# Patient Record
Sex: FEMALE | Race: BLACK OR AFRICAN AMERICAN | Employment: FULL TIME | URBAN - METROPOLITAN AREA
[De-identification: names, ages, dates, MRNs, and addresses within clinical notes are randomized per-mention and may not be internally consistent; named-entity substitution may affect disease eponyms.]

---

## 2017-01-25 ENCOUNTER — OFFICE VISIT (OUTPATIENT)
Dept: ORTHOPEDIC SURGERY | Age: 47
End: 2017-01-25

## 2017-01-25 VITALS
WEIGHT: 233 LBS | HEIGHT: 65 IN | RESPIRATION RATE: 18 BRPM | HEART RATE: 74 BPM | SYSTOLIC BLOOD PRESSURE: 122 MMHG | TEMPERATURE: 98.2 F | OXYGEN SATURATION: 98 % | BODY MASS INDEX: 38.82 KG/M2 | DIASTOLIC BLOOD PRESSURE: 67 MMHG

## 2017-01-25 DIAGNOSIS — M48.061 LUMBAR STENOSIS: ICD-10-CM

## 2017-01-25 DIAGNOSIS — M47.816 FACET ARTHROPATHY, LUMBAR: ICD-10-CM

## 2017-01-25 DIAGNOSIS — M54.31 RIGHT SIDED SCIATICA: Primary | Chronic | ICD-10-CM

## 2017-01-25 RX ORDER — IBUPROFEN 800 MG/1
800 TABLET ORAL
COMMUNITY
Start: 2015-06-02

## 2017-01-25 RX ORDER — HYDROCODONE BITARTRATE AND ACETAMINOPHEN 5; 325 MG/1; MG/1
TABLET ORAL
COMMUNITY
Start: 2016-08-22 | End: 2017-01-25

## 2017-01-25 RX ORDER — AMOXICILLIN 500 MG/1
CAPSULE ORAL
Refills: 0 | COMMUNITY
Start: 2016-12-15 | End: 2017-08-23

## 2017-01-25 RX ORDER — TERCONAZOLE 4 MG/G
CREAM VAGINAL
Refills: 1 | COMMUNITY
Start: 2016-11-04 | End: 2017-08-23

## 2017-01-25 RX ORDER — METHOCARBAMOL 500 MG/1
500 TABLET, FILM COATED ORAL
COMMUNITY
End: 2017-08-23

## 2017-01-25 RX ORDER — TOPIRAMATE 25 MG/1
TABLET ORAL
Qty: 90 TAB | Refills: 1 | Status: SHIPPED | OUTPATIENT
Start: 2017-01-25 | End: 2017-01-25 | Stop reason: SDUPTHER

## 2017-01-25 RX ORDER — TOPIRAMATE 25 MG/1
TABLET ORAL
Qty: 270 TAB | Refills: 1 | Status: SHIPPED | OUTPATIENT
Start: 2017-01-25 | End: 2017-07-11 | Stop reason: SDUPTHER

## 2017-01-25 RX ORDER — ACETAMINOPHEN AND CODEINE PHOSPHATE 300; 30 MG/1; MG/1
1 TABLET ORAL
Qty: 40 TAB | Refills: 0 | Status: SHIPPED | OUTPATIENT
Start: 2017-01-25 | End: 2017-07-11 | Stop reason: SDUPTHER

## 2017-01-25 RX ORDER — ATORVASTATIN CALCIUM 10 MG/1
TABLET, FILM COATED ORAL
Refills: 11 | COMMUNITY
Start: 2017-01-18

## 2017-01-25 NOTE — PATIENT INSTRUCTIONS
Electromyogram (EMG) and Nerve Conduction Studies: About These Tests  What are they? An electromyogram (EMG) measures the electrical activity of your muscles when you are not using them (at rest) and when you tighten them (muscle contraction). Nerve conduction studies (NCS) measure how well and how fast the nerves can send electrical signals. EMG and nerve conduction studies are often done together. If they are done together, the nerve conduction studies are done before the EMG. Why are they done? You may need an EMG to find diseases that damage your muscles or nerves or to find why you cannot move your muscles (paralysis), why they feel weak, or why they twitch. You may need nerve conduction studies to find damage to the nerves that lead from the brain and spinal cord to the rest of the body (peripheral nervous system). Nerve conduction studies are often used to help find nerve disorders, such as carpal tunnel syndrome. How can you prepare for these tests? · Tell your doctors ALL the medicines, vitamins, supplements, and herbal remedies you take. Some medicines can affect the test results. You may need to stop taking some medicines before you have this test.  · If you take aspirin or some other blood thinner, be sure to talk to your doctor. He or she will tell you if you should stop taking it before your test. Make sure that you understand exactly what your doctor wants you to do. · Wear loose-fitting clothing. You may be given a hospital gown to wear. · The electrodes for the test are attached to your skin. Your skin needs to be clean and free of sprays, oils, creams, and lotions. What happens during the tests? You lie on a table or bed or sit in a reclining chair so your muscles are relaxed. For an EMG:  · Your doctor will insert a needle electrode into a muscle.  This will record the electrical activity while the muscle is at rest. You may feel a quick, sharp pain when the needle electrode is put into a muscle. · Your doctor will ask you to tighten the same muscle slowly and steadily while the electrical activity is recorded. · Your doctor may move the electrode to a different area of the muscle or a different muscle. For nerve conduction studies:  · Your doctor will attach two types of electrodes to your skin. ¨ One type of electrode is placed over a nerve and will give the nerve an electrical pulse. ¨ The other type of electrode is placed over the muscle that the nerve controls. It will record how long it takes the muscle to react to the electrical pulse. · You will be able to feel the electrical pulses. They are small shocks and are safe. What else should you know about these tests? · After an EMG, you may be sore and have a tingling feeling in your muscles for up to 2 days. You may have small bruises or swelling at the needle site. · For an EMG, you may be asked to sign a consent form. Talk to your doctor about any concerns you have about the need for the test, its risks, how it will be done, or what the results will mean. How long do they take? · An EMG may take 30 to 60 minutes. · Nerve conduction tests may take from 15 minutes to 1 hour or more. It depends on how many nerves and muscles your doctor tests. What happens after these tests? · If any of the test areas are sore:  ¨ Put ice or a cold pack on the area for 10 to 20 minutes at a time. Put a thin cloth between the ice and your skin. ¨ Take an over-the-counter pain medicine, such as acetaminophen (Tylenol), ibuprofen (Advil, Motrin), or naproxen (Aleve). Be safe with medicines. Read and follow all instructions on the label. · You will probably be able to go home right away. · You can go back to your usual activities right away. When should you call for help?   Watch closely for changes in your health, and be sure to contact your doctor if:  · Muscle pain from an EMG test gets worse or you have swelling, tenderness, or pus at any of the needle sites. · You have any problems that you think may be from the test.  · You have any questions about the test or have not received your results. Follow-up care is a key part of your treatment and safety. Be sure to make and go to all appointments, and call your doctor if you are having problems. It's also a good idea to keep a list of the medicines you take. Ask your doctor when you can expect to have your test results. Where can you learn more? Go to http://mat-jena.info/. Enter A767 in the search box to learn more about \"Electromyogram (EMG) and Nerve Conduction Studies: About These Tests. \"  Current as of: June 1, 2016  Content Version: 11.1  © 8044-6711 [employer]. Care instructions adapted under license by [employer] (which disclaims liability or warranty for this information). If you have questions about a medical condition or this instruction, always ask your healthcare professional. [address] any warranty or liability for your use of this information.

## 2017-01-25 NOTE — PROGRESS NOTES
Elizabeth Renteria entered per Dr. Burrell Form as documented on blue sheet: EMG right leg, Topamax 25 mg 1 tab PO QHS x 1 week then 2 tabs PO QHS x 1 week then increase to 3 tabs PO QHS #90 with 1 RF

## 2017-01-25 NOTE — PROGRESS NOTES
[address]  [address], [address]  [place] [address]  Phone: [phone]  Fax: [phone]        Kelsi Ross  : 1970  PCP: Tracey Gomes MD    PROGRESS NOTE      ASSESSMENT AND PLAN    Stacey Maguire was seen today for back pain. Diagnoses and all orders for this visit:    Right sided sciatica  -     acetaminophen-codeine (TYLENOL #3) 300-30 mg per tablet; Take 1 Tab by mouth two (2) times daily as needed for Pain. Max Daily Amount: 2 Tabs. -     EMG ONE EXTREMITY LOWER RT; Future    Facet arthropathy, lumbar (HCC)  -     EMG ONE EXTREMITY LOWER RT; Future    Lumbar stenosis  -     acetaminophen-codeine (TYLENOL #3) 300-30 mg per tablet; Take 1 Tab by mouth two (2) times daily as needed for Pain. Max Daily Amount: 2 Tabs. -     EMG ONE EXTREMITY LOWER RT; Future    Other orders  -     topiramate (TOPAMAX) 25 mg tablet; 1 tab PO QHS x 1 week, then 2 tabs PO QHS x 1 week, then 3 tabs PO QHS    1. Continue Cymbalta and Tylenol #3.   2. Trial of Topamax. 3. Referral to Dr. Ronald yBers, Spine Surgery, for surgical evaluation in two months if still symptomatic. 4. EMG of RLE, RA/DM risk factors for mononeuritis. .   5. Given information on EMG. Follow-up Disposition:  Return for sx eval in 6-8 weeks for right S1 radic. HISTORY OF PRESENT ILLNESS  Saira Guillermo is a 42 y.o. female. Pt presents to the office for a f/u visit for RT sciatica and blocks. She had a RT L5 and S1 SNRB in 2016. Pt reports that she had benefit from her injection for only one week before her RT sciatica returned. She denies any negative side effects. from continues to have sciatica on the right side. Her pain radiates down the posterior aspect of thigh and calf. There are times when her pain will stop at her knee. Pt has been suffering from RT sciatica for about two years. She experiences her pain constantly throughout the day.  She notes that her pain interferes with her activity during the day. She has difficulty with laying down at night as well. During the night she will experience paresthesia in her RLE. Her RT foot will go completely numb during the night and with ambulation. She denies any previous hip injuries. She denies any saddle paresthesia. Pt takes Cymbalta 60 mg QHS. Pt takes Tylenol #3 with benefit. Pt has run out of Tylenol #3. Last fill of Tylenol #3 through this office was in October 2016. Pt denies any dizziness, confusion, uncontrolled constipation, and cravings due to controlled substances. She has failed Lyrica due to weight gain. She has tried Topamax for weight loss. Denies persistent fevers, chills, weight changes, neurogenic bowel or bladder symptoms. Pt denies recent ED visits or hospitalizations. PMHx of DM and hypertension. DM is stable at this time, her A1C levels have lowered. RA under control. Pt goes to the gym to stay active. This will often increase her pain. Pain Assessment  1/25/2017   Location of Pain Back   Location Modifiers -   Severity of Pain 0   Quality of Pain Sharp;Dull;Aching   Quality of Pain Comment -   Duration of Pain Persistent   Frequency of Pain Constant   Aggravating Factors Bending;Exercise;Kneeling;Squatting;Standing;Walking   Limiting Behavior Yes   Relieving Factors Rest   Result of Injury No       PAST MEDICAL HISTORY   Past Medical History   Diagnosis Date    Diabetes ([address]     Polyarthralgia 9/15     + IRWIN, trial plaquenil Dr. Victor M Kuhn Rheumatoid arthritis [facility])        Past Surgical History   Procedure Laterality Date    Hx gyn       ectopic pregnancy    Hx tubal ligation     . MEDICATIONS    Current Outpatient Prescriptions   Medication Sig Dispense Refill    HYDROcodone-acetaminophen (NORCO) 5-325 mg per tablet Take 1 Tab by Mouth Every 4 Hours As Needed for Pain.       ibuprofen (MOTRIN) 800 mg tablet 800 mg.      methocarbamol (ROBAXIN) 500 mg tablet 500 mg.      atorvastatin (LIPITOR) 10 mg tablet TK 1 T PO D  11    terconazole (TERAZOL 7) 0.4 % vaginal cream IVB FOR 7 NIGHTS  1    DULoxetine (CYMBALTA) 30 mg capsule TK 1 C PO NIGHTLY  0    DULoxetine (CYMBALTA) 60 mg capsule Take 1 Cap by mouth nightly. Indications: CHRONIC MUSCULOSKELETAL PAIN, NEUROPATHIC PAIN 90 Cap 1    acetaminophen-codeine (TYLENOL #3) 300-30 mg per tablet Take 1 Tab by mouth two (2) times daily as needed for Pain. Max Daily Amount: 2 Tabs. 40 Tab 0    DULoxetine (CYMBALTA) 60 mg capsule Take 1 Cap by mouth daily. 30 Cap 2    lisinopril (PRINIVIL, ZESTRIL) 2.5 mg tablet Take 2.5 mg by mouth. 5    aspirin delayed-release 81 mg tablet Take 81 mg by mouth daily. 5    folic acid (FOLVITE) 1 mg tablet Take 1 mg by mouth daily. 2    hydroxychloroquine (PLAQUENIL) 200 mg tablet Take 200 mg by mouth daily. 1    methotrexate (RHEUMATREX) 2.5 mg tablet Take 2.5 mg by mouth every Monday. Take 7 pills  Indications: RHEUMATOID ARTHRITIS  0    ERGOCALCIFEROL, VITAMIN D2, (VITAMIN D2 PO) Take  by mouth daily.  glucose blood VI test strips (FREESTYLE LITE STRIPS) strip *PLEASE DISPENSE ACCU-CHECK TEST STRIPS* Check fasting sugars once daily 100 Strip 11    Blood-Glucose Meter (FREESTYLE LITE METER) monitoring kit Check fasting sugars once daily 1 Kit 0    Lancets misc Check fasting sugar once daily 1 Each 11    amoxicillin (AMOXIL) 500 mg capsule TK 1 C PO TID FOR 10 DAYS  0        ALLERGIES  Allergies   Allergen Reactions    Diclofenac Hives    Gabapentin Hives     Even low dose of 100 mg.  Causes side effects          SOCIAL HISTORY    Social History     Social History    Marital status:      Spouse name: N/A    Number of children: N/A    Years of education: N/A     Occupational History    process cases      Social History Main Topics    Smoking status: Never Smoker    Smokeless tobacco: Never Used    Alcohol use No    Drug use: Not on file    Sexual activity: Not on file     Other Topics Concern    Not on file     Social History Narrative       FAMILY HISTORY  Family History   Problem Relation Age of Onset    No Known Problems Mother     No Known Problems Father        REVIEW OF SYSTEMS  Review of Systems   Constitutional: Negative for chills, fever and weight loss. Respiratory: Negative for shortness of breath. Cardiovascular: Negative for chest pain. Gastrointestinal: Negative for constipation. Negative for fecal incontinence   Genitourinary: Negative for dysuria. Negative for urinary incontinence   Musculoskeletal:        Per HPI   Skin: Negative for rash. Neurological: Positive for tingling. Negative for dizziness, tremors, focal weakness and headaches. Endo/Heme/Allergies: Does not bruise/bleed easily. Psychiatric/Behavioral: The patient does not have insomnia. PHYSICAL EXAMINATION  Visit Vitals    /67    Pulse 74    Temp 98.2 °F (36.8 °C) (Oral)    Resp 18    Ht 5' 5\" (1.651 m)    Wt 233 lb (105.7 kg)    SpO2 98%    BMI 38.77 kg/m2         Accompanied by self. Constitutional:  Well developed, well nourished, in no acute distress. Psychiatric: Affect and mood are appropriate. Integumentary: No rashes or abrasions noted on exposed areas. Cardiovascular/Peripheral Vascular: Intact l pulses. No peripheral edema is noted. Lymphatic:  No evidence of lymphedema. No cervical lymphadenopathy. SPINE/MUSCULOSKELETAL EXAM      Lumbar spine:  No rash, ecchymosis, or gross obliquity. No fasciculations. No focal atrophy is noted. Tenderness to palpation SI joint. Tenderness to palpation at the RT sciatic notch. Trochanters non tender. Sensation grossly intact to light touch. MOTOR:       Hip Flex  Quads Hamstrings Ankle DF EHL Ankle PF   Right +4/5 +4/5 +4/5 +4/5 +4/5 +4/5   Left +4/5 +4/5 +4/5 +4/5 +4/5 +4/5       Straight Leg raise negative. No pain hip ROM    Ambulation without assistive device. FWB.     Written by Pan Kelly, as dictated by Cornelia Zhou MD.    Ms. Dia Modi may have a reminder for a \"due or due soon\" health maintenance. I have asked that she contact her primary care provider for follow-up on this health maintenance.

## 2017-01-30 ENCOUNTER — DOCUMENTATION ONLY (OUTPATIENT)
Dept: ORTHOPEDIC SURGERY | Age: 47
End: 2017-01-30

## 2017-03-01 ENCOUNTER — OFFICE VISIT (OUTPATIENT)
Dept: NEUROLOGY | Age: 47
End: 2017-03-01

## 2017-03-01 DIAGNOSIS — M54.31 RIGHT SIDED SCIATICA: Primary | ICD-10-CM

## 2017-03-01 NOTE — COMMUNICATION BODY
[facility]  [address] [facility] [place], [address]  [phone]      [facility]    Neurophysiology Report      Patient: Meg Chepe     ID: [id number] Physician: Alecia Huddleston. Luz Maria Ibrahim MD   Gender: Male Ref Phys: Renetta Charlton MD   Handedness:      Study Date: March 1, 2017         Patient History: This 44-year-old patient has had greater than 4 years of pain in her right buttock radiating down to the right foot. On brief exam she has intact strength and reflexes. Sensation is intact to pinprick and light touch.       Nerve Conduction Studies  Anti Sensory Summary Table     Stim Site NR Peak (ms) Norm Peak (ms) O-P Amp (µV) Norm O-P Amp Dist (cm) Tanner (m/s)   Right Sural Anti Sensory (Ankle)   Calf    3.7 <4.4 16.0 >6.0 14.0 50.0   Site 2    3.5  5.5      Site 3    3.6  7.8        Motor Summary Table     Stim Site NR Onset (ms) Norm Onset (ms) O-P Amp (mV) Norm O-P Amp Dist (cm) Tanner (m/s) Norm Tanner (m/s)   Right Peroneal Motor (EDB)   Ankle    4.1 <6.5 2.8 >2.0 31.0 48.4 >44   Fibula (Head)    10.5  2.3       Right Tibial Motor (Abd Randolph Brev)   Ankle    3.5 <5.8 16.2 >4.0 30.0 41.7 >41   Knee    10.7  2.6           H Reflex Studies     NR H-Lat (ms) Lat Norm (ms) L-R H-Lat (ms) L-R Lat Norm   Left Tibial (Gastroc)      30.88 <0.0 0.00 <2.0   Right Tibial (Gastroc)      30.88 <0.0 0.00 <2.0     EMG     Side Muscle Nerve Root Ins Act Fibs Psw Fasc Amp Dur Poly Recrt Int Louise Back Comment   Right AntTibialis Dp Br Fibular L4-5 Nml Nml Nml None Nml Nml 0 Nml Nml    Right MedGastroc   Incr 2+ 1+ None Nml Nml 0 Nml Nml    Right VastusMed Femoral L2-4 Nml Nml Nml None Nml Nml 0 Nml Nml    Right ExtHallLong Dp Br Fibular L5, S1 Nml Nml Nml None Nml Nml 0 Nml Nml    Right BicepsFemS Sciatic L5-S1 Nml Nml Nml None Nml Nml 0 Nml Nml    Right Lumbo Parasp Up Rami L1-2 Nml Nml Nml          Right Lumbo Parasp Mid Rami L3-4 Nml Nml Nml          Right Lumbo Parasp Low Rami L5-S1 Incr 1+ 1+            NCS/EMG FINDINGS:     Evaluation of the Right peroneal motor, the Right tibial motor, and the Right sural sensory nerves were unremarkable. INTERPRETATION: This was an abnormal nerve conduction and EMG study showing evidence of a right-sided L5 or S1 radiculopathy. No signs of neuropathy were identified. ___________________________  Brianna Parnell MD          Waveforms:

## 2017-03-10 ENCOUNTER — OFFICE VISIT (OUTPATIENT)
Dept: ORTHOPEDIC SURGERY | Age: 47
End: 2017-03-10

## 2017-03-10 VITALS
RESPIRATION RATE: 18 BRPM | OXYGEN SATURATION: 97 % | HEIGHT: 65 IN | HEART RATE: 75 BPM | SYSTOLIC BLOOD PRESSURE: 136 MMHG | TEMPERATURE: 98.3 F | DIASTOLIC BLOOD PRESSURE: 67 MMHG | BODY MASS INDEX: 38.82 KG/M2 | WEIGHT: 233 LBS

## 2017-03-10 DIAGNOSIS — M54.31 RIGHT SIDED SCIATICA: ICD-10-CM

## 2017-03-10 DIAGNOSIS — M48.061 LUMBAR STENOSIS: Primary | ICD-10-CM

## 2017-03-10 NOTE — PROGRESS NOTES
[address]  [address], [address]  [place], [address] [place] [address] Street  Phone: [phone]  Fax: [phone]  INITIAL CONSULTATION  Patient: Bronwyn Parra                MRN: [id number]       SSN: [id number]  YOB: 1970        AGE: 42 y.o. SEX: female  Body mass index is 38.77 kg/(m^2). PCP: Thad Rae MD  03/10/17    Chief Complaint   Patient presents with    Back Pain    Referral / Consult         HISTORY OF PRESENT ILLNESS, RADIOGRAPHS, and PLAN:         HISTORY OF PRESENT ILLNESS:  Ms. Nigel Sethi is seen today at the request of Dr. Raymond Sesay and Dr. Arianna Reynoso. Ms. Nigel Sethi is a pleasant, 51-year-old female, generally healthy who works for [employer]. For the past four years, she has had right-sided buttock and leg pain and classic sciatica since doing an exercise ball routine while working out. The pain has been unresponsive to injections, medications, and therapy. It left her quite upset with this ongoing, right-side pain. She presents today for discussion and what further interventions one could consider. The patient is on antineuropathics, Cymbalta, and narcotics, muscle relaxers, and anti-inflammatories. PHYSICAL EXAMINATION:  Physical exam demonstrates a positive straight leg raise, possibly mild weakness of her right EHL. She has good strength of her hamstrings and quadriceps. RADIOGRAPHS:  MRI is reviewed, which demonstrates at L5-S1, foraminal stenosis due to ligamentous hypertrophy and some small disc protrusion causing pointed lateral recess foraminal stenosis. ASSESSMENT/PLAN: I discussed the matter at length with her. The patient has a remarkably normal spine, but if one looks closely at the L5-S1 segment, one initially thinks it is sacralized, but it is not.   It is a mobile segment that has lateral recess foraminal stenosis that is worse on her symptomatic right-hand side, and I think she is having radiculopathy due to it. This is consistent with her selective block and her EMG findings. The patient has been dealing with this issue at least for four years. At this point, I would say, certainly, she may continue to just accept and deal with her symptoms on a chronic basis, or she would be a candidate for a limited laminotomy, medial facetectomy in this area to decompress the nerve root. I discussed the risks, benefits, complications, and alternatives to surgery at length and in detail. She wishes to consider the matter. She will contact me if she wishes to proceed with surgical intervention. cc: Alexandro Morales M.D. Past Medical History:   Diagnosis Date    Diabetes ([facility] [address]     Polyarthralgia 9/15    + [facility]WIN, trial plaquenil Dr. Lashanda Jones Rheumatoid arthritis [facility])        Family History   Problem Relation Age of Onset    No Known Problems Mother     No Known Problems Father        Current Outpatient Prescriptions   Medication Sig Dispense Refill    ibuprofen (MOTRIN) 800 mg tablet 800 mg.      atorvastatin (LIPITOR) 10 mg tablet TK 1 T PO D  11    terconazole (TERAZOL 7) 0.4 % vaginal cream IVB FOR 7 NIGHTS  1    acetaminophen-codeine (TYLENOL #3) 300-30 mg per tablet Take 1 Tab by mouth two (2) times daily as needed for Pain. Max Daily Amount: 2 Tabs. 40 Tab 0    topiramate (TOPAMAX) 25 mg tablet SEE NOTES 270 Tab 1    lisinopril (PRINIVIL, ZESTRIL) 2.5 mg tablet Take 2.5 mg by mouth. 5    aspirin delayed-release 81 mg tablet Take 81 mg by mouth daily. 5    folic acid (FOLVITE) 1 mg tablet Take 1 mg by mouth daily. 2    hydroxychloroquine (PLAQUENIL) 200 mg tablet Take 200 mg by mouth daily. 1    methotrexate (RHEUMATREX) 2.5 mg tablet Take 2.5 mg by mouth every Monday. Take 7 pills  Indications: RHEUMATOID ARTHRITIS  0    ERGOCALCIFEROL, VITAMIN D2, (VITAMIN D2 PO) Take  by mouth daily.       glucose blood VI test strips (FREESTYLE LITE STRIPS) strip *PLEASE DISPENSE ACCU-CHECK TEST STRIPS* Check fasting sugars once daily 100 Strip 11    Blood-Glucose Meter (FREESTYLE LITE METER) monitoring kit Check fasting sugars once daily 1 Kit 0    Lancets misc Check fasting sugar once daily 1 Each 11    methocarbamol (ROBAXIN) 500 mg tablet 500 mg.      amoxicillin (AMOXIL) 500 mg capsule TK 1 C PO TID FOR 10 DAYS  0    DULoxetine (CYMBALTA) 30 mg capsule TK 1 C PO NIGHTLY  0    DULoxetine (CYMBALTA) 60 mg capsule Take 1 Cap by mouth nightly. Indications: CHRONIC MUSCULOSKELETAL PAIN, NEUROPATHIC PAIN 90 Cap 1    DULoxetine (CYMBALTA) 60 mg capsule Take 1 Cap by mouth daily. 30 Cap 2       Allergies   Allergen Reactions    Diclofenac Hives    Gabapentin Hives     Even low dose of 100 mg. Causes side effects    Lyrica [Pregabalin] Other (comments)     Weight gain       Past Surgical History:   Procedure Laterality Date    HX GYN      ectopic pregnancy    HX TUBAL LIGATION         Past Medical History:   Diagnosis Date    Diabetes ([address]     Polyarthralgia 9/15    + IRWIN, trial plaquenil Dr. Eileen Cristina Rheumatoid arthritis [facility])        Social History     Social History    Marital status:      Spouse name: N/A    Number of children: N/A    Years of education: N/A     Occupational History    process cases      Social History Main Topics    Smoking status: Never Smoker    Smokeless tobacco: Never Used    Alcohol use No    Drug use: Not on file    Sexual activity: Not on file     Other Topics Concern    Not on file     Social History Narrative         REVIEW OF SYSTEMS:   CONSTITUTIONAL SYMPTOMS:  Negative. EYES:  Negative. EARS, NOSE, THROAT AND MOUTH:  Negative. CARDIOVASCULAR:  Negative. RESPIRATORY:  Negative. GENITOURINARY: Negative. GASTROINTESTINAL:  Negative. INTEGUMENTARY (SKIN AND/OR BREAST):  Negative. MUSCULOSKELETAL: Per HPI.   ENDOCRINE/RHEUMATOLOGIC:  Negative. NEUROLOGICAL:  Per HPI.    HEMATOLOGIC/LYMPHATIC: Negative. ALLERGIC/IMMUNOLOGIC:  Negative. PSYCHIATRIC:  Negative. PHYSICAL EXAMINATION:   Visit Vitals    /67    Pulse 75    Temp 98.3 °F (36.8 °C) (Oral)    Resp 18    Ht 5' 5\" (1.651 m)    Wt 233 lb (105.7 kg)    SpO2 97%    BMI 38.77 kg/m2    PAIN SCALE: 1/10    CONSTITUTIONAL: The patient is in no apparent distress and is alert and oriented x 3. HEENT: Normocephalic. Hearing grossly intact. NECK: Supple and symmetric. no tenderness, or masses were felt. RESPIRATORY: No labored breathing. CARDIOVASCULAR: The carotid pulses were normal. Peripheral pulses were 2+. CHEST: Normal AP diameter and normal contour without any kyphoscoliosis. LYMPHATIC: No lymphadenopathy was appreciated in the neck, axillae or groin. SKIN: Negative for scars, rashes, lesions, or ulcers on the right upper, right lower, left upper, left lower and trunk. NEUROLOGICAL: Alert and oriented x 3. Ambulation without assistive device. FWB. EXTREMITIES:  See musculoskeletal.  MUSCULOSKELETAL:   Head and Neck:  Negative for misalignment, asymmetry, crepitation, defects, tenderness masses or effusions.  Left Upper Extremity: Inspection, percussion and palpation preformed. Domingos sign is negative.  Right Upper Extremity: Inspection, percussion and palpation preformed. Domingos sign is negative.  Spine, Ribs and Pelvis: Back pain that radiates into RLE. Inspection, percussion and palpation preformed. Negative for misalignment, asymmetry, crepitation, defects, tenderness masses or effusions.  Left Lower Extremity: Inspection, percussion and palpation preformed. Negative straight leg raise.  Right Lower Extremity: RT sciatica. Weakness. Inspection, percussion and palpation preformed. Negative straight leg raise. SPINE EXAM:     Lumbar spine: No rash, ecchymosis, or gross obliquity. No focal atrophy is noted. ASSESSMENT    ICD-10-CM ICD-9-CM    1. Lumbar stenosis M48.06 724.02    2.  Right sided sciatica M54.31 724.3        Written by Von Bishop, as dictated by Ernesto Norris MD.

## 2017-03-19 ENCOUNTER — HOSPITAL ENCOUNTER (EMERGENCY)
Age: 47
Discharge: HOME | End: 2017-03-19
Attending: EMERGENCY MEDICINE
Payer: COMMERCIAL

## 2017-03-19 ENCOUNTER — APPOINTMENT (OUTPATIENT)
Dept: CT IMAGING | Age: 47
End: 2017-03-19
Attending: EMERGENCY MEDICINE
Payer: COMMERCIAL

## 2017-03-19 VITALS
BODY MASS INDEX: 39.61 KG/M2 | HEART RATE: 82 BPM | TEMPERATURE: 98.4 F | SYSTOLIC BLOOD PRESSURE: 126 MMHG | DIASTOLIC BLOOD PRESSURE: 75 MMHG | OXYGEN SATURATION: 100 % | HEIGHT: 64 IN | WEIGHT: 232 LBS | RESPIRATION RATE: 12 BRPM

## 2017-03-19 DIAGNOSIS — K57.92 ACUTE DIVERTICULITIS: Primary | ICD-10-CM

## 2017-03-19 LAB
ALBUMIN SERPL BCP-MCNC: 3.3 G/DL (ref 3.4–5)
ALBUMIN/GLOB SERPL: 0.8 {RATIO} (ref 0.8–1.7)
ALP SERPL-CCNC: 62 U/L (ref 45–117)
ALT SERPL-CCNC: 17 U/L (ref 13–56)
AMYLASE SERPL-CCNC: 40 U/L (ref 25–115)
ANION GAP BLD CALC-SCNC: 10 MMOL/L (ref 3–18)
APPEARANCE UR: CLEAR
AST SERPL W P-5'-P-CCNC: 14 U/L (ref 15–37)
BASOPHILS # BLD AUTO: 0 K/UL (ref 0–0.06)
BASOPHILS # BLD: 0 % (ref 0–2)
BILIRUB SERPL-MCNC: 1 MG/DL (ref 0.2–1)
BILIRUB UR QL: NEGATIVE
BUN SERPL-MCNC: 12 MG/DL (ref 7–18)
BUN/CREAT SERPL: 16 (ref 12–20)
CALCIUM SERPL-MCNC: 8.7 MG/DL (ref 8.5–10.1)
CHLORIDE SERPL-SCNC: 104 MMOL/L (ref 100–108)
CO2 SERPL-SCNC: 26 MMOL/L (ref 21–32)
COLOR UR: YELLOW
CREAT SERPL-MCNC: 0.76 MG/DL (ref 0.6–1.3)
DIFFERENTIAL METHOD BLD: ABNORMAL
EOSINOPHIL # BLD: 0.3 K/UL (ref 0–0.4)
EOSINOPHIL NFR BLD: 3 % (ref 0–5)
ERYTHROCYTE [DISTWIDTH] IN BLOOD BY AUTOMATED COUNT: 14.9 % (ref 11.6–14.5)
GLOBULIN SER CALC-MCNC: 4 G/DL (ref 2–4)
GLUCOSE SERPL-MCNC: 115 MG/DL (ref 74–99)
GLUCOSE UR STRIP.AUTO-MCNC: NEGATIVE MG/DL
HCG UR QL: NEGATIVE
HCT VFR BLD AUTO: 37.6 % (ref 35–45)
HGB BLD-MCNC: 12.8 G/DL (ref 12–16)
HGB UR QL STRIP: NEGATIVE
KETONES UR QL STRIP.AUTO: NEGATIVE MG/DL
LEUKOCYTE ESTERASE UR QL STRIP.AUTO: NEGATIVE
LIPASE SERPL-CCNC: 107 U/L (ref 73–393)
LYMPHOCYTES # BLD AUTO: 16 % (ref 21–52)
LYMPHOCYTES # BLD: 1.8 K/UL (ref 0.9–3.6)
MCH RBC QN AUTO: 28.1 PG (ref 24–34)
MCHC RBC AUTO-ENTMCNC: 34 G/DL (ref 31–37)
MCV RBC AUTO: 82.6 FL (ref 74–97)
MONOCYTES # BLD: 1 K/UL (ref 0.05–1.2)
MONOCYTES NFR BLD AUTO: 9 % (ref 3–10)
NEUTS SEG # BLD: 7.9 K/UL (ref 1.8–8)
NEUTS SEG NFR BLD AUTO: 72 % (ref 40–73)
NITRITE UR QL STRIP.AUTO: NEGATIVE
PH UR STRIP: 5.5 [PH] (ref 5–8)
PLATELET # BLD AUTO: 175 K/UL (ref 135–420)
PMV BLD AUTO: 12.3 FL (ref 9.2–11.8)
POTASSIUM SERPL-SCNC: 3.8 MMOL/L (ref 3.5–5.5)
PROT SERPL-MCNC: 7.3 G/DL (ref 6.4–8.2)
PROT UR STRIP-MCNC: NEGATIVE MG/DL
RBC # BLD AUTO: 4.55 M/UL (ref 4.2–5.3)
SODIUM SERPL-SCNC: 140 MMOL/L (ref 136–145)
SP GR UR REFRACTOMETRY: 1.02 (ref 1–1.03)
UROBILINOGEN UR QL STRIP.AUTO: 0.2 EU/DL (ref 0.2–1)
WBC # BLD AUTO: 10.9 K/UL (ref 4.6–13.2)

## 2017-03-19 PROCEDURE — 85025 COMPLETE CBC W/AUTO DIFF WBC: CPT | Performed by: EMERGENCY MEDICINE

## 2017-03-19 PROCEDURE — 96361 HYDRATE IV INFUSION ADD-ON: CPT

## 2017-03-19 PROCEDURE — 74177 CT ABD & PELVIS W/CONTRAST: CPT

## 2017-03-19 PROCEDURE — 96360 HYDRATION IV INFUSION INIT: CPT

## 2017-03-19 PROCEDURE — 83690 ASSAY OF LIPASE: CPT | Performed by: EMERGENCY MEDICINE

## 2017-03-19 PROCEDURE — 81003 URINALYSIS AUTO W/O SCOPE: CPT | Performed by: EMERGENCY MEDICINE

## 2017-03-19 PROCEDURE — 74011636320 HC RX REV CODE- 636/320: Performed by: EMERGENCY MEDICINE

## 2017-03-19 PROCEDURE — 82150 ASSAY OF AMYLASE: CPT | Performed by: EMERGENCY MEDICINE

## 2017-03-19 PROCEDURE — 81025 URINE PREGNANCY TEST: CPT | Performed by: EMERGENCY MEDICINE

## 2017-03-19 PROCEDURE — 99284 EMERGENCY DEPT VISIT MOD MDM: CPT

## 2017-03-19 PROCEDURE — 74011250636 HC RX REV CODE- 250/636: Performed by: EMERGENCY MEDICINE

## 2017-03-19 PROCEDURE — 80053 COMPREHEN METABOLIC PANEL: CPT | Performed by: EMERGENCY MEDICINE

## 2017-03-19 RX ORDER — CIPROFLOXACIN 500 MG/1
500 TABLET ORAL 2 TIMES DAILY
Qty: 20 TAB | Refills: 0 | Status: SHIPPED | OUTPATIENT
Start: 2017-03-19 | End: 2017-03-29

## 2017-03-19 RX ORDER — METRONIDAZOLE 500 MG/1
500 TABLET ORAL 4 TIMES DAILY
Qty: 40 TAB | Refills: 0 | Status: SHIPPED | OUTPATIENT
Start: 2017-03-19 | End: 2017-03-29

## 2017-03-19 RX ORDER — SODIUM CHLORIDE 9 MG/ML
150 INJECTION, SOLUTION INTRAVENOUS ONCE
Status: COMPLETED | OUTPATIENT
Start: 2017-03-19 | End: 2017-03-19

## 2017-03-19 RX ADMIN — IOPAMIDOL 100 ML: 612 INJECTION, SOLUTION INTRAVENOUS at 06:49

## 2017-03-19 RX ADMIN — SODIUM CHLORIDE 150 ML/HR: 900 INJECTION, SOLUTION INTRAVENOUS at 06:53

## 2017-03-19 NOTE — ED TRIAGE NOTES
Patient C/O lower abdominal pain since yesterday. States she feels the need to have a bowel movement but only mucus and blood is coming out.

## 2017-03-19 NOTE — LETTER
NOTIFICATION RETURN TO WORK / SCHOOL 
 
3/19/2017 8:23 AM 
 
Ms. Joyce Betts [address] To Whom It May Concern: 
 
Joyce Betts is currently under the care of [id number] [facility] EMERGENCY DEPT. She will return to work/school on: 03/21/2017 If there are questions or concerns please have the patient contact our office. Sincerely, Octavia Robles D.O.

## 2017-03-19 NOTE — ED NOTES
Pt returned from CT in stable condition. Pt denies ay abdominal pain. Pt denies any needs from RN at this time. Pt verbalized understanding that awaiting CT result.

## 2017-03-19 NOTE — ED NOTES
Verbal shift change report given to Airam Arauz RN (oncoming nurse) by Johana Mead RN (offgoing nurse). Report included the following information SBAR, Kardex, MAR and Recent Results.

## 2017-03-19 NOTE — ED NOTES
I have reviewed discharge instructions with the patient. The patient verbalized understanding. Current Discharge Medication List      START taking these medications    Details   ciprofloxacin HCl (CIPRO) 500 mg tablet Take 1 Tab by mouth two (2) times a day for 10 days. Qty: 20 Tab, Refills: 0      metroNIDAZOLE (FLAGYL) 500 mg tablet Take 1 Tab by mouth four (4) times daily for 10 days.   Qty: 40 Tab, Refills: 0         Patient armband removed and shredded

## 2017-03-19 NOTE — ED PROVIDER NOTES
HPI Comments: 6:33 AM 6:33 AM Heather Corley is a 42 y.o. female with a history of DM and hyperlipidemia, who presents to ED c/o intermittent, lower abd pain onset yesterday. Pt explains that she was unable to go about her daily activities due to pain and had trouble passing a bowel movement due to pain. She also stated that when she went to use the bathroom the noticed a mucus coming out as if she was passing a bowel. Patient reports that her OB/GYN informed her that she had an oversized ovary, which has caused pain before, but never this bad. Pt denies any use of medication, tobacco, or EtOH. No other concerns at this time. PCP: Lindsey Sanchez MD     Patient is a 42 y.o. female presenting with abdominal pain. The history is provided by the patient. Abdominal Pain           Past Medical History:   Diagnosis Date    Diabetes ([address]     Polyarthralgia 9/15    + IRWIN, trial plaquenil Dr. Cecily Cartwright Rheumatoid arthritis [facility])        Past Surgical History:   Procedure Laterality Date    HX GYN      ectopic pregnancy    HX TUBAL LIGATION           Family History:   Problem Relation Age of Onset    No Known Problems Mother     No Known Problems Father        Social History     Social History    Marital status:      Spouse name: N/A    Number of children: N/A    Years of education: N/A     Occupational History    process cases      Social History Main Topics    Smoking status: Never Smoker    Smokeless tobacco: Never Used    Alcohol use No    Drug use: Not on file    Sexual activity: Not on file     Other Topics Concern    Not on file     Social History Narrative         ALLERGIES: Diclofenac; Gabapentin; and Lyrica [pregabalin]    Review of Systems   Constitutional: Negative. HENT: Negative. Eyes: Negative. Respiratory: Negative. Cardiovascular: Negative. Gastrointestinal: Positive for abdominal pain (Diffuse Lower). Endocrine: Negative. Genitourinary: Negative. Musculoskeletal: Negative. Skin: Negative. Allergic/Immunologic: Negative. Neurological: Negative. Hematological: Negative. Psychiatric/Behavioral: Negative. All other systems reviewed and are negative. Vitals:    03/19/17 0554   BP: 106/70   Pulse: 82   Resp: 12   Temp: 98.4 °F (36.9 °C)   SpO2: 99%   Weight: 105.2 kg (232 lb)   Height: 5' 4\" (1.626 m)            Physical Exam   Constitutional: She is oriented to person, place, and time. She appears well-developed and well-nourished. No distress. HENT:   Head: Normocephalic. Right Ear: External ear normal.   Left Ear: External ear normal.   Mouth/Throat: No oropharyngeal exudate. Eyes: Conjunctivae and EOM are normal. Pupils are equal, round, and reactive to light. Right eye exhibits no discharge. Left eye exhibits no discharge. No scleral icterus. Neck: Normal range of motion. Neck supple. No JVD present. No tracheal deviation present. No thyromegaly present. Cardiovascular: Normal rate, regular rhythm, normal heart sounds and intact distal pulses. Exam reveals no gallop and no friction rub. No murmur heard. Pulmonary/Chest: Effort normal and breath sounds normal. No stridor. No respiratory distress. She has no wheezes. She has no rales. She exhibits no tenderness. Abdominal: Soft. Bowel sounds are normal. She exhibits no distension and no mass. There is tenderness (Diffuse lower abd). There is no rebound and no guarding. Musculoskeletal: Normal range of motion. She exhibits no edema or tenderness. Lymphadenopathy:     She has no cervical adenopathy. Neurological: She is alert and oriented to person, place, and time. She displays normal reflexes. No cranial nerve deficit. She exhibits normal muscle tone. Coordination normal.   Skin: Skin is warm and dry. No rash noted. She is not diaphoretic. No erythema. No pallor. Nursing note and vitals reviewed.        MDM  Number of Diagnoses or Management Options     Amount and/or Complexity of Data Reviewed  Clinical lab tests: ordered and reviewed  Tests in the radiology section of CPT®: ordered and reviewed    Risk of Complications, Morbidity, and/or Mortality  Presenting problems: moderate  Diagnostic procedures: high  Management options: moderate      ED Course       Procedures    7:00AM Care of patient transferred to Dr. Nisha Lopez:   Sherryle Solomons, am acting as a scribe for, and in the presence of, Dr. Daly Ibrahim MD March 19, 2017 at 6:53 AM     Signed by: Matt Gonsales, March 19, 2017 6:53 AM      ...................................................................................................................................................................... 7:00AM Care turned over from Dr. Shannon Llamas. All notes reviewed. 8:11 AM CT ABD/PELV W CONT  Impression: Acute sigmoid diverticulitis. No complication of abscess, perforation, bowel  obstruction. Cholelithiasis. 8:13 AM I have reassessed the patient. Patient is feeling the same. Patient will be treated outpatient and will be prescribed Cipro and Flagyl. Patient was discharged in stable condition. Patient is to return to emergency department if any new or worsening condition.       Scribe Attestation:   Sherryle Solomons, am acting as a scribe for, and in the presence of, Dr. Jamil Franklin DO March 19, 2017 at 8:14 AM     Signed by: Matt Gonsales, March 19, 2017 8:14 AM

## 2017-03-19 NOTE — DISCHARGE INSTRUCTIONS
Diverticulitis: Care Instructions  Your Care Instructions    Diverticulitis occurs when pouches form in the wall of the colon and become inflamed or infected. It can be very painful. Doctors aren't sure what causes diverticulitis. There is no proof that foods such as nuts, seeds, or berries cause it or make it worse. A low-fiber diet may cause the colon to work harder to push stool forward. Pouches may form because of this extra work. It may be hard to think about healthy eating while you're in pain. But as you recover, you might think about how you can use healthy eating for overall better health. Healthy eating may help you avoid future attacks. Follow-up care is a key part of your treatment and safety. Be sure to make and go to all appointments, and call your doctor if you are having problems. It's also a good idea to know your test results and keep a list of the medicines you take. How can you care for yourself at home? · Drink plenty of fluids, enough so that your urine is light yellow or clear like water. If you have kidney, heart, or liver disease and have to limit fluids, talk with your doctor before you increase the amount of fluids you drink. · Stick to liquids or a bland diet (plain rice, bananas, dry toast or crackers, applesauce) until you are feeling better. Then you can return to regular foods and gradually increase the amount of fiber in your diet. · Use a heating pad set on low on your belly to relieve mild cramps and pain. · Get extra rest until you are feeling better. · Be safe with medicines. Read and follow all instructions on the label. ¨ If the doctor gave you a prescription medicine for pain, take it as prescribed. ¨ If you are not taking a prescription pain medicine, ask your doctor if you can take an over-the-counter medicine. · If your doctor prescribed antibiotics, take them as directed. Do not stop taking them just because you feel better.  You need to take the full course of antibiotics. To prevent future attacks of diverticulitis  · Avoid constipation:  ¨ Include fruits, vegetables, beans, and whole grains in your diet each day. These foods are high in fiber. ¨ Drink plenty of fluids, enough so that your urine is light yellow or clear like water. If you have kidney, heart, or liver disease and have to limit fluids, talk with your doctor before you increase the amount of fluids you drink. ¨ Get some exercise every day. Build up slowly to 30 to 60 minutes a day on 5 or more days of the week. ¨ Take a fiber supplement, such as Citrucel or Metamucil, every day if needed. Read and follow all instructions on the label. ¨ Schedule time each day for a bowel movement. Having a daily routine may help. Take your time and do not strain when having a bowel movement. When should you call for help? Call 911 anytime you think you may need emergency care. For example, call if:  · You passed out (lost consciousness). · You vomit blood or what looks like coffee grounds. · You pass maroon or very bloody stools. Call your doctor now or seek immediate medical care if:  · You have severe pain or swelling in your belly. · You have a new or higher fever. · You cannot keep down fluids or medicines. · You have new pain that gets worse when you move or cough. Watch closely for changes in your health, and be sure to contact your doctor if:  · The symptoms you had when you first started feeling sick come back. · You do not get better as expected. Where can you learn more? Go to http://mat-jena.info/. Enter H901 in the search box to learn more about \"Diverticulitis: Care Instructions. \"  Current as of: August 9, 2016  Content Version: 11.1  © 4803-1494 [employer]. Care instructions adapted under license by [employer] (which disclaims liability or warranty for this information).  If you have questions about a medical condition or this instruction, always ask your healthcare professional. [place] [address] any warranty or liability for your use of this information.

## 2017-03-24 ENCOUNTER — HOSPITAL ENCOUNTER (EMERGENCY)
Age: 47
Discharge: HOME | End: 2017-03-24
Attending: EMERGENCY MEDICINE | Admitting: EMERGENCY MEDICINE
Payer: COMMERCIAL

## 2017-03-24 VITALS
DIASTOLIC BLOOD PRESSURE: 85 MMHG | BODY MASS INDEX: 39.82 KG/M2 | SYSTOLIC BLOOD PRESSURE: 124 MMHG | HEART RATE: 73 BPM | TEMPERATURE: 98.2 F | RESPIRATION RATE: 16 BRPM | OXYGEN SATURATION: 97 % | WEIGHT: 232 LBS

## 2017-03-24 DIAGNOSIS — K57.20 DIVERTICULITIS OF LARGE INTESTINE WITH PERFORATION AND ABSCESS WITHOUT BLEEDING: ICD-10-CM

## 2017-03-24 DIAGNOSIS — Z87.19 HISTORY OF MELENA: Primary | ICD-10-CM

## 2017-03-24 LAB
AMORPH CRY URNS QL MICRO: ABNORMAL
ANION GAP BLD CALC-SCNC: 9 MMOL/L (ref 3–18)
APPEARANCE UR: CLEAR
BASOPHILS # BLD AUTO: 0 K/UL (ref 0–0.06)
BASOPHILS # BLD: 1 % (ref 0–2)
BILIRUB UR QL: NEGATIVE
BUN SERPL-MCNC: 10 MG/DL (ref 7–18)
BUN/CREAT SERPL: 12 (ref 12–20)
CALCIUM SERPL-MCNC: 8.7 MG/DL (ref 8.5–10.1)
CHLORIDE SERPL-SCNC: 104 MMOL/L (ref 100–108)
CO2 SERPL-SCNC: 27 MMOL/L (ref 21–32)
COLOR UR: ABNORMAL
CREAT SERPL-MCNC: 0.82 MG/DL (ref 0.6–1.3)
DIFFERENTIAL METHOD BLD: ABNORMAL
EOSINOPHIL # BLD: 0.2 K/UL (ref 0–0.4)
EOSINOPHIL NFR BLD: 3 % (ref 0–5)
EPITH CASTS URNS QL MICRO: ABNORMAL /LPF (ref 0–5)
ERYTHROCYTE [DISTWIDTH] IN BLOOD BY AUTOMATED COUNT: 14.8 % (ref 11.6–14.5)
GLUCOSE SERPL-MCNC: 99 MG/DL (ref 74–99)
GLUCOSE UR STRIP.AUTO-MCNC: NEGATIVE MG/DL
HCT VFR BLD AUTO: 37.9 % (ref 35–45)
HGB BLD-MCNC: 12.8 G/DL (ref 12–16)
HGB UR QL STRIP: ABNORMAL
KETONES UR QL STRIP.AUTO: NEGATIVE MG/DL
LEUKOCYTE ESTERASE UR QL STRIP.AUTO: ABNORMAL
LIPASE SERPL-CCNC: 107 U/L (ref 73–393)
LYMPHOCYTES # BLD AUTO: 43 % (ref 21–52)
LYMPHOCYTES # BLD: 2.8 K/UL (ref 0.9–3.6)
MCH RBC QN AUTO: 27.9 PG (ref 24–34)
MCHC RBC AUTO-ENTMCNC: 33.8 G/DL (ref 31–37)
MCV RBC AUTO: 82.8 FL (ref 74–97)
MONOCYTES # BLD: 0.8 K/UL (ref 0.05–1.2)
MONOCYTES NFR BLD AUTO: 12 % (ref 3–10)
MUCOUS THREADS URNS QL MICRO: ABNORMAL /LPF
NEUTS SEG # BLD: 2.7 K/UL (ref 1.8–8)
NEUTS SEG NFR BLD AUTO: 41 % (ref 40–73)
NITRITE UR QL STRIP.AUTO: NEGATIVE
PH UR STRIP: 5.5 [PH] (ref 5–8)
PLATELET # BLD AUTO: 227 K/UL (ref 135–420)
PMV BLD AUTO: 11.4 FL (ref 9.2–11.8)
POTASSIUM SERPL-SCNC: 3.8 MMOL/L (ref 3.5–5.5)
PROT UR STRIP-MCNC: NEGATIVE MG/DL
RBC # BLD AUTO: 4.58 M/UL (ref 4.2–5.3)
RBC #/AREA URNS HPF: ABNORMAL /HPF (ref 0–5)
SODIUM SERPL-SCNC: 140 MMOL/L (ref 136–145)
SP GR UR REFRACTOMETRY: 1.02 (ref 1–1.03)
UROBILINOGEN UR QL STRIP.AUTO: 0.2 EU/DL (ref 0.2–1)
WBC # BLD AUTO: 6.4 K/UL (ref 4.6–13.2)
WBC URNS QL MICRO: ABNORMAL /HPF (ref 0–4)

## 2017-03-24 PROCEDURE — 83690 ASSAY OF LIPASE: CPT | Performed by: PHYSICIAN ASSISTANT

## 2017-03-24 PROCEDURE — 81001 URINALYSIS AUTO W/SCOPE: CPT | Performed by: PHYSICIAN ASSISTANT

## 2017-03-24 PROCEDURE — 85025 COMPLETE CBC W/AUTO DIFF WBC: CPT | Performed by: PHYSICIAN ASSISTANT

## 2017-03-24 PROCEDURE — 80048 BASIC METABOLIC PNL TOTAL CA: CPT | Performed by: PHYSICIAN ASSISTANT

## 2017-03-24 PROCEDURE — 99283 EMERGENCY DEPT VISIT LOW MDM: CPT

## 2017-03-24 PROCEDURE — 99282 EMERGENCY DEPT VISIT SF MDM: CPT

## 2017-03-24 NOTE — ED PROVIDER NOTES
HPI Comments: 39yo female presenting to ED with 1 episode of dark stool noted today with BM. Pt states she had abnormal diet las night so unsure if this affected her stool. Pt was at work when she noted stool, concerning due to recent diagnosis of divirticulitis therefore came to ED. Denies bright red blood in stool, toilet paper. +bleeding with urination but states she is starting menstrual cycle. Denies abdominal pain/cramping at present. Pt denies fever, chills, NVD, urinary symptoms, CP, SOB, back pain or any other complaints. States she was seen in ED 5 days ago, diagnosed with diverticulitis and sent home with abx and pain medication. Has apt with GI doctor saw PCP 2 days ago. Hx of colonoscopy in past.  Past Medical History:  No date: Diabetes ([facility] [address]  9/15: Polyarthralgia      Comment: + IRWIN, trial plaquenil Dr. Kailyn Brewer  No date: Rheumatoid arthritis ([facility] [[email or web address]   PCP Cathleen Tatum MD     Patient is a 42 y.o. female presenting with abdominal pain and melena. The history is provided by the patient. Abdominal Pain    Associated symptoms include melena. Pertinent negatives include no fever, no nausea, no vomiting, no dysuria, no hematuria, no headaches, no myalgias, no chest pain and no back pain. Melena    Associated symptoms include abdominal pain and vaginal bleeding. Pertinent negatives include no fever, no nausea, no vomiting, no hematuria, no chest pain, no headaches, no coughing and no rash.         Past Medical History:   Diagnosis Date    Diabetes ([facility]     Polyarthralgia 9/15    + IRWIN, trial plaquenil Dr. Eileen Cristina Rheumatoid arthritis [facility])        Past Surgical History:   Procedure Laterality Date    HX GYN      ectopic pregnancy    HX TUBAL LIGATION           Family History:   Problem Relation Age of Onset    No Known Problems Mother     No Known Problems Father        Social History     Social History    Marital status:      Spouse name: N/A    Number of children: N/A    Years of education: N/A     Occupational History    process cases      Social History Main Topics    Smoking status: Never Smoker    Smokeless tobacco: Never Used    Alcohol use No    Drug use: Not on file    Sexual activity: Not on file     Other Topics Concern    Not on file     Social History Narrative         ALLERGIES: Diclofenac; Gabapentin; and Lyrica [pregabalin]    Review of Systems   Constitutional: Negative for chills and fever. HENT: Negative. Negative for congestion and facial swelling. Eyes: Negative for discharge and redness. Respiratory: Negative for cough and shortness of breath. Cardiovascular: Negative for chest pain. Gastrointestinal: Positive for abdominal pain and melena. Negative for nausea and vomiting. Endocrine: Negative. Genitourinary: Positive for vaginal bleeding. Negative for decreased urine volume, dysuria, flank pain, hematuria, menstrual problem, urgency and vaginal pain. Musculoskeletal: Negative for back pain, myalgias and neck pain. Skin: Negative for rash and wound. Allergic/Immunologic: Negative. Neurological: Negative for dizziness, light-headedness and headaches. Hematological: Negative. Psychiatric/Behavioral: Negative. Vitals:    03/24/17 1358   BP: 124/85   Pulse: 73   Resp: 16   Temp: 98.2 °F (36.8 °C)   SpO2: 97%   Weight: 105.2 kg (232 lb)            Physical Exam   Constitutional: She is oriented to person, place, and time. She appears well-developed and well-nourished. No distress. HENT:   Head: Normocephalic and atraumatic. Mouth/Throat: Oropharynx is clear and moist.   Eyes: Conjunctivae are normal.   Neck: Normal range of motion. Neck supple. Cardiovascular: Normal rate, regular rhythm and normal heart sounds. Pulmonary/Chest: Effort normal and breath sounds normal. No respiratory distress. She has no wheezes. She exhibits no tenderness. Abdominal: Soft.  Bowel sounds are normal. She exhibits no distension and no mass. There is no tenderness. There is no rebound and no guarding. No abdominal pain on exam/ no CVAT. No rebound or guarding. Soft   Genitourinary: Rectum normal. Rectal exam shows no external hemorrhoid, no internal hemorrhoid, no fissure, no mass, no tenderness, anal tone normal and guaiac negative stool. Musculoskeletal: Normal range of motion. Neurological: She is alert and oriented to person, place, and time. No cranial nerve deficit. Coordination normal.   Skin: Skin is warm and dry. No rash noted. She is not diaphoretic. Psychiatric: She has a normal mood and affect. Nursing note and vitals reviewed. MDM  Number of Diagnoses or Management Options  History of melena:   Diagnosis management comments: Reviewed CT from previous ED visit. Labs Reviewed  CBC WITH AUTOMATED DIFF - Abnormal; Notable for the following:      RDW                           14.8 (*)               MONOCYTES                     12 (*)              All other components within normal limits  URINALYSIS W/ RFLX MICROSCOPIC - Abnormal; Notable for the following:      Blood                         LARGE (*)               Leukocyte Esterase            SMALL (*)            All other components within normal limits  URINE MICROSCOPIC ONLY - Abnormal; Notable for the following:      Mucus                         1+ (*)                 Amorphous Crystals            1+ (*)              All other components within normal limits  METABOLIC PANEL, BASIC  LIPASE  CT from previous ED visit. IMPRESSION:  Acute sigmoid diverticulitis. No complication of abscess, perforation, bowel  obstruction. Cholelithiasis. Rechecked the patient and updated her on all current results. Abdomen remains non tender, soft and not acute  Pt is very comfortable, active, afebrile, non toxic appearing. Labs reassuring. H&H stable. Pt is still ob abx and taking as directed. Has apt with GI (cant remember date).     Return precautions discussed and understood. Discussed proper way to take medications. Discussed treatment plan, return precautions, symptomatic relief, and expected time to improvement. All questions answered. Patient is stable for discharge and outpatient management.    Lanny Paul PA-C 4:27 PM        Amount and/or Complexity of Data Reviewed  Clinical lab tests: ordered and reviewed  Review and summarize past medical records: yes      ED Course       Procedures

## 2017-03-24 NOTE — LETTER
NOTIFICATION RETURN TO WORK / SCHOOL 
 
3/24/2017 5:00 PM 
 
Ms. [address] To Whom It May Concern: 
 
Joyce Betts is currently under the care of [id number] [facility] EMERGENCY DEPT. She will return to work/school on: 3/27/2017 If there are questions or concerns please have the patient contact our office. Sincerely, SKYLAR Tucker RN

## 2017-03-24 NOTE — ED TRIAGE NOTES
Patient states she was diagnosed with diverticulitis this past week, states she now has blood in her stool. Pt states she called her doctor who referred her here. Pt reports continued abdominal cramping and nausea.

## 2017-03-24 NOTE — DISCHARGE INSTRUCTIONS
Diverticulitis: Care Instructions  Your Care Instructions    Diverticulitis occurs when pouches form in the wall of the colon and become inflamed or infected. It can be very painful. Doctors aren't sure what causes diverticulitis. There is no proof that foods such as nuts, seeds, or berries cause it or make it worse. A low-fiber diet may cause the colon to work harder to push stool forward. Pouches may form because of this extra work. It may be hard to think about healthy eating while you're in pain. But as you recover, you might think about how you can use healthy eating for overall better health. Healthy eating may help you avoid future attacks. Follow-up care is a key part of your treatment and safety. Be sure to make and go to all appointments, and call your doctor if you are having problems. It's also a good idea to know your test results and keep a list of the medicines you take. How can you care for yourself at home? · Drink plenty of fluids, enough so that your urine is light yellow or clear like water. If you have kidney, heart, or liver disease and have to limit fluids, talk with your doctor before you increase the amount of fluids you drink. · Stick to liquids or a bland diet (plain rice, bananas, dry toast or crackers, applesauce) until you are feeling better. Then you can return to regular foods and gradually increase the amount of fiber in your diet. · Use a heating pad set on low on your belly to relieve mild cramps and pain. · Get extra rest until you are feeling better. · Be safe with medicines. Read and follow all instructions on the label. ¨ If the doctor gave you a prescription medicine for pain, take it as prescribed. ¨ If you are not taking a prescription pain medicine, ask your doctor if you can take an over-the-counter medicine. · If your doctor prescribed antibiotics, take them as directed. Do not stop taking them just because you feel better.  You need to take the full course of antibiotics. To prevent future attacks of diverticulitis  · Avoid constipation:  ¨ Include fruits, vegetables, beans, and whole grains in your diet each day. These foods are high in fiber. ¨ Drink plenty of fluids, enough so that your urine is light yellow or clear like water. If you have kidney, heart, or liver disease and have to limit fluids, talk with your doctor before you increase the amount of fluids you drink. ¨ Get some exercise every day. Build up slowly to 30 to 60 minutes a day on 5 or more days of the week. ¨ Take a fiber supplement, such as Citrucel or Metamucil, every day if needed. Read and follow all instructions on the label. ¨ Schedule time each day for a bowel movement. Having a daily routine may help. Take your time and do not strain when having a bowel movement. When should you call for help? Call 911 anytime you think you may need emergency care. For example, call if:  · You passed out (lost consciousness). · You vomit blood or what looks like coffee grounds. · You pass maroon or very bloody stools. Call your doctor now or seek immediate medical care if:  · You have severe pain or swelling in your belly. · You have a new or higher fever. · You cannot keep down fluids or medicines. · You have new pain that gets worse when you move or cough. Watch closely for changes in your health, and be sure to contact your doctor if:  · The symptoms you had when you first started feeling sick come back. · You do not get better as expected. Where can you learn more? Go to http://mat-jena.info/. Enter H901 in the search box to learn more about \"Diverticulitis: Care Instructions. \"  Current as of: August 9, 2016  Content Version: 11.1  © 0774-2600 [employer]. Care instructions adapted under license by [employer] (which disclaims liability or warranty for this information).  If you have questions about a medical condition or this instruction, always ask your healthcare professional. [place] [address] any warranty or liability for your use of this information. Learning About Diverticulosis and Diverticulitis  What are diverticulosis and diverticulitis? In diverticulosis and diverticulitis, pouches called diverticula form in the wall of the large intestine, or colon. · In diverticulosis, the pouches do not cause any pain or other symptoms. · In diverticulitis, the pouches get inflamed or infected and cause symptoms. Doctors aren't sure what causes these pouches in the colon. But they think that a low-fiber diet may play a role. Without fiber to add bulk to the stool, the colon has to work harder than normal to push the stool forward. The pressure from this may cause pouches to form in weak spots along the colon. Some people with diverticulosis get diverticulitis. But experts don't know why this happens. What are the symptoms? · In diverticulosis, most people don't have symptoms. But pouches sometimes bleed. · In diverticulitis, symptoms may last from a few hours to a week or more. They include:  ¨ Belly pain. This is usually in the lower left side. It is sometimes worse when you move. This is the most common symptom. ¨ Fever and chills. ¨ Bloating and gas. ¨ Diarrhea or constipation. ¨ Nausea and sometimes vomiting. ¨ Not feeling like eating. How can you prevent these problems? You may be able to lower your chance of getting diverticulitis. You can do this by taking steps to prevent constipation. · Eat fruits, vegetables, beans, and whole grains every day. These foods are high in fiber. · Drink plenty of fluids (enough so that your urine is light yellow or clear like water). If you have kidney, heart, or liver disease and have to limit fluids, talk with your doctor before you increase the amount of fluids you drink. · Get at least 30 minutes of exercise on most days of the week.  Walking is a good choice. You also may want to do other activities, such as running, swimming, cycling, or playing tennis or team sports. · Take a fiber supplement, such as Citrucel or Metamucil, every day if needed. Read and follow all instructions on the label. · Schedule time each day for a bowel movement. Having a daily routine may help. Take your time and do not strain when having a bowel movement. Some people avoid nuts, seeds, berries, and popcorn. They believe that these foods might get trapped in the diverticula and cause pain. But there is no proof that these foods cause diverticulitis or make it worse. How are these problems treated? · The best way to treat diverticulosis is to avoid constipation. (See the tips above.)  · Treatment for diverticulitis includes antibiotics and often a change in your diet. You may need only liquids at first. Your doctor may suggest pain medicines for pain or belly cramps. In some cases, surgery may be needed. Follow-up care is a key part of your treatment and safety. Be sure to make and go to all appointments, and call your doctor if you are having problems. It's also a good idea to know your test results and keep a list of the medicines you take. Where can you learn more? Go to http://mat-jena.info/. Enter T975 in the search box to learn more about \"Learning About Diverticulosis and Diverticulitis. \"  Current as of: August 9, 2016  Content Version: 11.1  © 9256-4369 [employer]. Care instructions adapted under license by [employer] (which disclaims liability or warranty for this information). If you have questions about a medical condition or this instruction, always ask your healthcare professional. [place] [address] any warranty or liability for your use of this information.        Lower Gastrointestinal Bleeding: Care Instructions  Your Care Instructions    The digestive or gastrointestinal tract goes from the mouth to the anus. It is often called the GI tract. Bleeding in the lower GI tract can happen anywhere in your small or large intestine. It can also happen in your rectum or anus. In some cases, it is caused by an infection, cancer, or inflammatory bowel disease. Or it may be caused by hemorrhoids, diverticulitis, or clotting problems. Light bleeding may not cause any symptoms at first. But if you continue to bleed for a while, you may feel very weak or tired. Sudden, heavy bleeding means you need to see a doctor right away. This kind of bleeding can be very dangerous. But it can usually be cured or controlled. The doctor may do some tests to find the cause of your bleeding. Follow-up care is a key part of your treatment and safety. Be sure to make and go to all appointments, and call your doctor if you are having problems. It's also a good idea to know your test results and keep a list of the medicines you take. How can you care for yourself at home? · Be safe with medicines. Take your medicines exactly as prescribed. Call your doctor if you think you are having a problem with your medicine. You will get more details on the specific medicines your doctor prescribes. · Do not take aspirin or other anti-inflammatory medicines, such as naproxen (Aleve) or ibuprofen (Advil, Motrin), without talking to your doctor first. Ask your doctor if it is okay to use acetaminophen (Tylenol). · Do not drink alcohol. · The bleeding may make you lose iron. So it's important to eat foods that have a lot of iron. These include red meat, shellfish, poultry, and eggs. They also include beans, raisins, whole-grain breads, and leafy green vegetables. If you want help planning meals, you can meet with a dietitian. When should you call for help? Call 911 anytime you think you may need emergency care. For example, call if:  · You have sudden, severe belly pain. · You vomit blood or what looks like coffee grounds.   · You passed out (lost consciousness). · Your stools are maroon or very bloody. Call your doctor now or seek immediate medical care if:  · You are dizzy or lightheaded, or you feel like you may faint. · Your stools are black and look like tar, or they have streaks of blood. · You have belly pain. · You vomit or have nausea. Watch closely for changes in your health, and be sure to contact your doctor if you do not get better as expected. Where can you learn more? Go to http://mat-jena.info/. Enter G038 in the search box to learn more about \"Lower Gastrointestinal Bleeding: Care Instructions. \"  Current as of: May 27, 2016  Content Version: 11.1  © 0844-0853 [employer]. Care instructions adapted under license by [employer] (which disclaims liability or warranty for this information). If you have questions about a medical condition or this instruction, always ask your healthcare professional. [place] [address] any warranty or liability for your use of this information.

## 2017-03-24 NOTE — LETTER
[address] [place] [address] [facility] 
[address] [facility] [address] 
[phone] Work/School Note Date: 3/24/2017 To Whom It May concern: 
 
Angie Cerna was seen and treated today in the emergency room by the following provider(s): 
Attending Provider: Solange Eagle MD 
Physician Assistant: Army Angus PA-C. [facility] return to work 3/17/17 Sincerely, Army Angus PA-C

## 2017-06-23 ENCOUNTER — HOSPITAL ENCOUNTER (OUTPATIENT)
Dept: MAMMOGRAPHY | Age: 47
Discharge: HOME | End: 2017-06-23
Attending: INTERNAL MEDICINE
Payer: COMMERCIAL

## 2017-06-23 DIAGNOSIS — Z12.31 VISIT FOR SCREENING MAMMOGRAM: ICD-10-CM

## 2017-06-23 PROCEDURE — 77067 SCR MAMMO BI INCL CAD: CPT

## 2017-07-11 ENCOUNTER — TELEPHONE (OUTPATIENT)
Dept: ORTHOPEDIC SURGERY | Age: 47
End: 2017-07-11

## 2017-07-11 DIAGNOSIS — M54.31 RIGHT SIDED SCIATICA: Chronic | ICD-10-CM

## 2017-07-11 DIAGNOSIS — M48.061 LUMBAR STENOSIS: ICD-10-CM

## 2017-07-11 RX ORDER — ACETAMINOPHEN AND CODEINE PHOSPHATE 300; 30 MG/1; MG/1
1 TABLET ORAL
Qty: 40 TAB | Refills: 0 | OUTPATIENT
Start: 2017-07-11

## 2017-07-11 RX ORDER — TOPIRAMATE 25 MG/1
75 TABLET ORAL DAILY
Qty: 270 TAB | Refills: 1 | Status: SHIPPED | OUTPATIENT
Start: 2017-07-11 | End: 2017-08-23 | Stop reason: DRUGHIGH

## 2017-07-11 NOTE — TELEPHONE ENCOUNTER
Patient is requesting refill on Topamax and Tylenol #3. Patient last seen 3/10/17, next visit 8/23. Dr. Luz Manuel last prescribed Tylenol #3 in Jan 2017 according to .  Please advise

## 2017-07-11 NOTE — TELEPHONE ENCOUNTER
Need refill of Topirmate 25mg and Tylenol#3 for pain. Next available appt w/ Dr. Winn Mt is not till 08/23. Please call patient back at [phone].

## 2017-08-23 ENCOUNTER — OFFICE VISIT (OUTPATIENT)
Dept: ORTHOPEDIC SURGERY | Age: 47
End: 2017-08-23

## 2017-08-23 VITALS
HEART RATE: 72 BPM | HEIGHT: 64 IN | WEIGHT: 233.8 LBS | OXYGEN SATURATION: 99 % | RESPIRATION RATE: 18 BRPM | TEMPERATURE: 98.5 F | DIASTOLIC BLOOD PRESSURE: 83 MMHG | BODY MASS INDEX: 39.91 KG/M2 | SYSTOLIC BLOOD PRESSURE: 142 MMHG

## 2017-08-23 DIAGNOSIS — M54.31 RIGHT SIDED SCIATICA: Primary | Chronic | ICD-10-CM

## 2017-08-23 RX ORDER — TOPIRAMATE 100 MG/1
100 TABLET, FILM COATED ORAL
Qty: 90 TAB | Refills: 1 | Status: SHIPPED | OUTPATIENT
Start: 2017-08-23

## 2017-08-23 NOTE — PATIENT INSTRUCTIONS
Learning About Laminotomy and Laminectomy Surgery  What are laminotomy and laminectomy? Laminotomy and laminectomy are two surgeries to relieve pressure on the spinal cord. They make the opening in the spine (spinal canal) larger. As you get older or if you have an injury, the opening for the spine gets smaller. This condition is called stenosis. During a laminectomy, the doctor removes pieces of the bony covering (the lamina) and other tissues that are squeezing the spinal cord and nerves. A laminotomy removes less of the bony covering. Both surgeries make the opening for the spinal cord and nerves larger. This takes pressure off the nerves. How is a laminotomy or laminectomy done? The doctor will make a cut (incision) over your spine. The muscles around your spine are pulled to the side so the doctor can work on the bones (vertebra) of the spine. The doctor can then trim thickened tissue, such as bulging discs. And he or she can take out some bone to make the opening for the spinal cord or nerves larger. Sometimes, after the doctor makes the opening larger, he or she will do another type of surgery called spinal fusion. The doctor will insert a small piece of bone from another part of the body or from a bone bank. This piece of bone can help the spine heal. Sometimes small plates and screws are used to keep the bones in place. Surgery will last from 1 to 1½ hours, depending on how much tissue the doctor cuts away. If a spinal fusion is done at the same time, surgery will last 2 to 4 hours. What can you expect after a laminectomy or laminotomy? Plan for a hospital stay of 1 or 2 days. You will have some pain after surgery. You will get medicine to treat the pain. Do not be discouraged if you do not feel better right away. As soon as the anesthesia wears off, you can start moving in the hospital. Get up and walk carefully as soon as you can.  This will help your body heal. You may shower on the day after surgery. You will have help changing the bandages over your stitches after you bathe. You can return to your usual activities after 4 to 6 weeks. It may take longer for you to do more strenuous exercise. Do not drive until your doctor says it is okay. If you had a spinal fusion, it needs time to heal. Sudden twisting movements can slow healing. If you had a spinal fusion done, you may need more time to get back to your usual activities. You may need to wear a brace. You may feel tired for several weeks after surgery. You will feel stiff and sore. But you should feel a little better every day. Daily exercise will help you get back your energy. Try walking. See if you can walk a little bit farther every day. Follow-up care is a key part of your treatment and safety. Be sure to make and go to all appointments, and call your doctor if you are having problems. It's also a good idea to know your test results and keep a list of the medicines you take. Where can you learn more? Go to http://mat-jena.info/. Enter Q597 in the search box to learn more about \"Learning About Laminotomy and Laminectomy Surgery. \"  Current as of: March 21, 2017  Content Version: 11.3  © 6476-9070 [employer]. Care instructions adapted under license by [employer] (which disclaims liability or warranty for this information). If you have questions about a medical condition or this instruction, always ask your healthcare professional. [place] [address] any warranty or liability for your use of this information.

## 2017-08-23 NOTE — PROGRESS NOTES
[facility] [address]  [address], [address]  [place] [address]  Phone: [phone]  Fax: [phone]        Cathie Taylor  : 1970  PCP: Senait Miller MD    PROGRESS NOTE      ASSESSMENT AND PLAN    Diagnoses and all orders for this visit:    1. Right sided sciatica  -     topiramate (TOPAMAX) 100 mg tablet; Take 1 Tab by mouth nightly. 1. Pt to consult with Neurosurgery for second opinion-Dr. Compa Tee. 2. Increase Topamax to 100 mg QHS. 3. Advised to stay active as tolerated. 4. Given information on laminectomy and laminotomy surgery. Follow-up Disposition:  Return in about 6 months (around 2018). HISTORY OF PRESENT ILLNESS  Saira Antonio is a 42 y.o. female. Pt presents to the office for a f/u visit for back pain. She was last evaluated by Dr. Abraham Johnson in 3/2017 and was recommended for a limited laminotomy or continued conservative treatments. Pt returns to the office as she does not wish to have surgery. Pt notes that she wishes to try something else for her pain. Pt requesting second opinion for her back. Pt has been told by friends that neurosurgeons should do her back surgery. Her back pain has gotten better due to yoga and piliates. Pt continues to have sciatica on the right side. Her pain radiates down the posterior aspect of thigh, does not radiate past her knee. Pt has been suffering from RT sciatica for about two years and describes it as annoying. She experiences her pain constantly throughout the day and will push herself to work through her pain. She notes that her pain does interfere with her activity during the day. She denies any saddle paresthesia. Pt takes Cymbalta 60 mg QHS. Pt takes Tylenol #3 with benefit. Pt denies any dizziness, confusion, uncontrolled constipation, and cravings due to controlled substances. She has failed Lyrica due to weight gain.  She is on Topamax 75 mg QHS, has not lost any weight but has not gained any either. Denies persistent fevers, chills, weight changes, neurogenic bowel or bladder symptoms. Pt denies recent ED visits or hospitalizations. PMHx of DM, stable,  and hypertension. Has RA. Pain Assessment  8/23/2017   Location of Pain Back;Knee   Pain Location Comment to back of knee   Location Modifiers Right   Severity of Pain 5   Quality of Pain Aching   Quality of Pain Comment -   Duration of Pain Persistent   Frequency of Pain Intermittent   Date Pain First Started -   Aggravating Factors Walking;Standing;Stairs;Squatting;Kneeling;Exercise;Straightening;Stretching;Bending   Aggravating Factors Comment -   Limiting Behavior -   Relieving Factors (No Data)   Relieving Factors Comment medicine   Result of Injury No       PAST MEDICAL HISTORY   Past Medical History:   Diagnosis Date    Diabetes ([facility] [address]     Polyarthralgia 9/15    + [facility], trial plaquenil Dr. Valencia Mendes Rheumatoid arthritis [facility])        Past Surgical History:   Procedure Laterality Date    HX GYN      ectopic pregnancy    HX TUBAL LIGATION     . MEDICATIONS    Current Outpatient Prescriptions   Medication Sig Dispense Refill    topiramate (TOPAMAX) 25 mg tablet Take 3 Tabs by mouth daily. Take 3 tabs daily at night. 270 Tab 1    acetaminophen-codeine (TYLENOL #3) 300-30 mg per tablet Take 1 Tab by mouth two (2) times daily as needed for Pain. Max Daily Amount: 2 Tabs. 40 Tab 0    ibuprofen (MOTRIN) 800 mg tablet 800 mg.      atorvastatin (LIPITOR) 10 mg tablet TK 1 T PO D  11    aspirin delayed-release 81 mg tablet Take 81 mg by mouth daily. 5    folic acid (FOLVITE) 1 mg tablet Take 1 mg by mouth daily. 2    hydroxychloroquine (PLAQUENIL) 200 mg tablet Take 200 mg by mouth daily. 1    methotrexate (RHEUMATREX) 2.5 mg tablet Take 2.5 mg by mouth every Monday. Take 7 pills  Indications: RHEUMATOID ARTHRITIS  0    ERGOCALCIFEROL, VITAMIN D2, (VITAMIN D2 PO) Take  by mouth daily.       methocarbamol (ROBAXIN) 500 mg tablet 500 mg.      amoxicillin (AMOXIL) 500 mg capsule TK 1 C PO TID FOR 10 DAYS  0    terconazole (TERAZOL 7) 0.4 % vaginal cream IVB FOR 7 NIGHTS  1    DULoxetine (CYMBALTA) 30 mg capsule TK 1 C PO NIGHTLY  0    DULoxetine (CYMBALTA) 60 mg capsule Take 1 Cap by mouth nightly. Indications: CHRONIC MUSCULOSKELETAL PAIN, NEUROPATHIC PAIN 90 Cap 1    DULoxetine (CYMBALTA) 60 mg capsule Take 1 Cap by mouth daily. 30 Cap 2    lisinopril (PRINIVIL, ZESTRIL) 2.5 mg tablet Take 2.5 mg by mouth. 5    glucose blood VI test strips (FREESTYLE LITE STRIPS) strip *PLEASE DISPENSE ACCU-CHECK TEST STRIPS* Check fasting sugars once daily 100 Strip 11    Blood-Glucose Meter (FREESTYLE LITE METER) monitoring kit Check fasting sugars once daily 1 Kit 0    Lancets misc Check fasting sugar once daily 1 Each 11        ALLERGIES  Allergies   Allergen Reactions    Diclofenac Hives    Gabapentin Hives     Even low dose of 100 mg. Causes side effects    Lyrica [Pregabalin] Other (comments)     Weight gain          SOCIAL HISTORY    Social History     Social History    Marital status:      Spouse name: N/A    Number of children: N/A    Years of education: N/A     Occupational History    process cases      Social History Main Topics    Smoking status: Never Smoker    Smokeless tobacco: Never Used    Alcohol use No    Drug use: No    Sexual activity: Not on file     Other Topics Concern    Not on file     Social History Narrative       FAMILY HISTORY  Family History   Problem Relation Age of Onset    HIV/AIDS Mother     Heart Disease Father        REVIEW OF SYSTEMS  Review of Systems   Constitutional: Negative for chills, fever and weight loss. Respiratory: Negative for shortness of breath. Cardiovascular: Negative for chest pain. Gastrointestinal: Negative for constipation. Negative for fecal incontinence   Genitourinary: Negative for dysuria.         Negative for urinary incontinence   Musculoskeletal:        Per HPI   Skin: Negative for rash. Neurological: Positive for tingling. Negative for dizziness, tremors, focal weakness and headaches. Endo/Heme/Allergies: Does not bruise/bleed easily. Psychiatric/Behavioral: The patient does not have insomnia. PHYSICAL EXAMINATION  Visit Vitals    /83    Pulse 72    Temp 98.5 °F (36.9 °C) (Oral)    Resp 18    Ht 5' 4\" (1.626 m)    Wt 233 lb 12.8 oz (106.1 kg)    SpO2 99%    BMI 40.13 kg/m2         Accompanied by self. Constitutional:  Well developed, well nourished, in no acute distress. Psychiatric: Affect and mood are appropriate. Integumentary: No rashes or abrasions noted on exposed areas. Cardiovascular/Peripheral Vascular: Intact l pulses. No peripheral edema is noted. Lymphatic:  No evidence of lymphedema. No cervical lymphadenopathy. SPINE/MUSCULOSKELETAL EXAM      Lumbar spine:  No rash, ecchymosis, or gross obliquity. No fasciculations. No focal atrophy is noted. Tenderness to palpation RT sciatic notch. Tenderness to palpation of RT trochanteric bursa. SI joints non-tender. Sensation grossly intact to light touch. MOTOR:       Hip Flex  Quads Hamstrings Ankle DF EHL Ankle PF   Right +4/5 +4/5 +4/5 +4/5 4/5 +4/5   Left +4/5 +4/5 +4/5 +4/5 +4/5 +4/5     Mild eversion weakness on the right. Positive SANJUANITA maneuver on the RT. Ambulation without assistive device. FWB. Written by Annika Donnelly, as dictated by Meghna Mackay MD.    I, Dr. Meghna Mackay MD, confirm that all documentation is accurate. Ms. Lyle Madrid may have a reminder for a \"due or due soon\" health maintenance. I have asked that she contact her primary care provider for follow-up on this health maintenance.

## 2017-09-07 ENCOUNTER — DOCUMENTATION ONLY (OUTPATIENT)
Dept: ORTHOPEDIC SURGERY | Age: 47
End: 2017-09-07

## 2017-09-07 NOTE — PROGRESS NOTES
Pt dropped off FMLA forms to be completed. Pt informed 7-10bus days, and would like to  at [employer] when ready.       FORMS SCANNED IN TO CHART!!!!!

## 2017-09-11 NOTE — PROGRESS NOTES
FMLA form completed, Dr. Scarlet Padgett signed.  Given to [facility] for processing

## 2017-11-05 ENCOUNTER — APPOINTMENT (OUTPATIENT)
Dept: GENERAL RADIOLOGY | Age: 47
End: 2017-11-05
Attending: EMERGENCY MEDICINE
Payer: COMMERCIAL

## 2017-11-05 ENCOUNTER — HOSPITAL ENCOUNTER (EMERGENCY)
Age: 47
Discharge: HOME | End: 2017-11-06
Attending: EMERGENCY MEDICINE
Payer: COMMERCIAL

## 2017-11-05 DIAGNOSIS — R07.9 CHEST PAIN, UNSPECIFIED TYPE: Primary | ICD-10-CM

## 2017-11-05 LAB
ANION GAP SERPL CALC-SCNC: 7 MMOL/L (ref 3–18)
BASOPHILS # BLD: 0 K/UL (ref 0–0.06)
BASOPHILS NFR BLD: 1 % (ref 0–2)
BUN SERPL-MCNC: 20 MG/DL (ref 7–18)
BUN/CREAT SERPL: 19 (ref 12–20)
CALCIUM SERPL-MCNC: 8.9 MG/DL (ref 8.5–10.1)
CHLORIDE SERPL-SCNC: 105 MMOL/L (ref 100–108)
CK MB CFR SERPL CALC: NORMAL % (ref 0–4)
CK MB SERPL-MCNC: <1 NG/ML (ref 5–25)
CK SERPL-CCNC: 68 U/L (ref 26–192)
CO2 SERPL-SCNC: 28 MMOL/L (ref 21–32)
CREAT SERPL-MCNC: 1.03 MG/DL (ref 0.6–1.3)
D DIMER PPP FEU-MCNC: 0.49 UG/ML(FEU)
DIFFERENTIAL METHOD BLD: ABNORMAL
EOSINOPHIL # BLD: 0.2 K/UL (ref 0–0.4)
EOSINOPHIL NFR BLD: 2 % (ref 0–5)
ERYTHROCYTE [DISTWIDTH] IN BLOOD BY AUTOMATED COUNT: 13.5 % (ref 11.6–14.5)
GLUCOSE SERPL-MCNC: 98 MG/DL (ref 74–99)
HCT VFR BLD AUTO: 38.4 % (ref 35–45)
HGB BLD-MCNC: 13.1 G/DL (ref 12–16)
LIPASE SERPL-CCNC: 180 U/L (ref 73–393)
LYMPHOCYTES # BLD: 3.7 K/UL (ref 0.9–3.6)
LYMPHOCYTES NFR BLD: 46 % (ref 21–52)
MCH RBC QN AUTO: 28.4 PG (ref 24–34)
MCHC RBC AUTO-ENTMCNC: 34.1 G/DL (ref 31–37)
MCV RBC AUTO: 83.1 FL (ref 74–97)
MONOCYTES # BLD: 0.8 K/UL (ref 0.05–1.2)
MONOCYTES NFR BLD: 10 % (ref 3–10)
NEUTS SEG # BLD: 3.3 K/UL (ref 1.8–8)
NEUTS SEG NFR BLD: 41 % (ref 40–73)
PLATELET # BLD AUTO: 190 K/UL (ref 135–420)
PMV BLD AUTO: 11.9 FL (ref 9.2–11.8)
POTASSIUM SERPL-SCNC: 3.7 MMOL/L (ref 3.5–5.5)
RBC # BLD AUTO: 4.62 M/UL (ref 4.2–5.3)
SODIUM SERPL-SCNC: 140 MMOL/L (ref 136–145)
TROPONIN I SERPL-MCNC: <0.02 NG/ML (ref 0–0.06)
WBC # BLD AUTO: 7.9 K/UL (ref 4.6–13.2)

## 2017-11-05 PROCEDURE — 96375 TX/PRO/DX INJ NEW DRUG ADDON: CPT

## 2017-11-05 PROCEDURE — 81025 URINE PREGNANCY TEST: CPT | Performed by: EMERGENCY MEDICINE

## 2017-11-05 PROCEDURE — 83690 ASSAY OF LIPASE: CPT | Performed by: EMERGENCY MEDICINE

## 2017-11-05 PROCEDURE — 71010 XR CHEST PORT: CPT

## 2017-11-05 PROCEDURE — 93005 ELECTROCARDIOGRAM TRACING: CPT

## 2017-11-05 PROCEDURE — 85025 COMPLETE CBC W/AUTO DIFF WBC: CPT | Performed by: EMERGENCY MEDICINE

## 2017-11-05 PROCEDURE — 74011250636 HC RX REV CODE- 250/636: Performed by: EMERGENCY MEDICINE

## 2017-11-05 PROCEDURE — 80048 BASIC METABOLIC PNL TOTAL CA: CPT | Performed by: EMERGENCY MEDICINE

## 2017-11-05 PROCEDURE — 82550 ASSAY OF CK (CPK): CPT | Performed by: EMERGENCY MEDICINE

## 2017-11-05 PROCEDURE — 96374 THER/PROPH/DIAG INJ IV PUSH: CPT

## 2017-11-05 PROCEDURE — 74011250637 HC RX REV CODE- 250/637: Performed by: EMERGENCY MEDICINE

## 2017-11-05 PROCEDURE — 96376 TX/PRO/DX INJ SAME DRUG ADON: CPT

## 2017-11-05 PROCEDURE — 85379 FIBRIN DEGRADATION QUANT: CPT | Performed by: EMERGENCY MEDICINE

## 2017-11-05 PROCEDURE — 99285 EMERGENCY DEPT VISIT HI MDM: CPT

## 2017-11-05 RX ORDER — ACETAMINOPHEN 325 MG/1
650 TABLET ORAL
Status: COMPLETED | OUTPATIENT
Start: 2017-11-05 | End: 2017-11-05

## 2017-11-05 RX ORDER — SODIUM CHLORIDE 9 MG/ML
1000 INJECTION, SOLUTION INTRAVENOUS ONCE
Status: COMPLETED | OUTPATIENT
Start: 2017-11-05 | End: 2017-11-05

## 2017-11-05 RX ORDER — HYDROMORPHONE HYDROCHLORIDE 1 MG/ML
1 INJECTION, SOLUTION INTRAMUSCULAR; INTRAVENOUS; SUBCUTANEOUS
Status: COMPLETED | OUTPATIENT
Start: 2017-11-05 | End: 2017-11-05

## 2017-11-05 RX ORDER — ONDANSETRON 2 MG/ML
4 INJECTION INTRAMUSCULAR; INTRAVENOUS
Status: COMPLETED | OUTPATIENT
Start: 2017-11-05 | End: 2017-11-05

## 2017-11-05 RX ADMIN — ACETAMINOPHEN 650 MG: 325 TABLET ORAL at 21:14

## 2017-11-05 RX ADMIN — ONDANSETRON 4 MG: 2 INJECTION INTRAMUSCULAR; INTRAVENOUS at 21:56

## 2017-11-05 RX ADMIN — HYDROMORPHONE HYDROCHLORIDE 1 MG: 1 INJECTION, SOLUTION INTRAMUSCULAR; INTRAVENOUS; SUBCUTANEOUS at 21:56

## 2017-11-05 RX ADMIN — SODIUM CHLORIDE 1000 ML: 900 INJECTION, SOLUTION INTRAVENOUS at 21:14

## 2017-11-05 NOTE — LETTER
NOTIFICATION RETURN TO WORK / SCHOOL 
 
11/6/2017 1:50 AM 
 
Ms. 64Conor Betts [address] To Whom It May Concern: 
 
Joyce Betts is currently under the care of [address] [facility] EMERGENCY DEPT. She will return to work/school on: 11/8/17 If there are questions or concerns please have the patient contact our office.  
 
 
 
Sincerely, 
 
 
 
Armand Cordero MD

## 2017-11-06 ENCOUNTER — APPOINTMENT (OUTPATIENT)
Dept: CT IMAGING | Age: 47
End: 2017-11-06
Attending: EMERGENCY MEDICINE
Payer: COMMERCIAL

## 2017-11-06 VITALS
OXYGEN SATURATION: 96 % | WEIGHT: 220 LBS | TEMPERATURE: 97.8 F | RESPIRATION RATE: 21 BRPM | DIASTOLIC BLOOD PRESSURE: 71 MMHG | HEART RATE: 72 BPM | BODY MASS INDEX: 37.76 KG/M2 | SYSTOLIC BLOOD PRESSURE: 131 MMHG

## 2017-11-06 LAB
CK MB CFR SERPL CALC: NORMAL % (ref 0–4)
CK MB SERPL-MCNC: <1 NG/ML (ref 5–25)
CK SERPL-CCNC: 56 U/L (ref 26–192)
HCG UR QL: NEGATIVE
TROPONIN I SERPL-MCNC: <0.02 NG/ML (ref 0–0.06)

## 2017-11-06 PROCEDURE — 71275 CT ANGIOGRAPHY CHEST: CPT

## 2017-11-06 PROCEDURE — 82550 ASSAY OF CK (CPK): CPT | Performed by: EMERGENCY MEDICINE

## 2017-11-06 PROCEDURE — 96376 TX/PRO/DX INJ SAME DRUG ADON: CPT

## 2017-11-06 PROCEDURE — 74011250636 HC RX REV CODE- 250/636: Performed by: EMERGENCY MEDICINE

## 2017-11-06 PROCEDURE — 74011636320 HC RX REV CODE- 636/320: Performed by: EMERGENCY MEDICINE

## 2017-11-06 RX ORDER — OXYCODONE AND ACETAMINOPHEN 5; 325 MG/1; MG/1
1 TABLET ORAL
Qty: 14 TAB | Refills: 0 | Status: SHIPPED | OUTPATIENT
Start: 2017-11-06

## 2017-11-06 RX ORDER — ONDANSETRON 4 MG/1
4 TABLET, ORALLY DISINTEGRATING ORAL
Qty: 14 TAB | Refills: 0 | Status: SHIPPED | OUTPATIENT
Start: 2017-11-06

## 2017-11-06 RX ORDER — HYDROMORPHONE HYDROCHLORIDE 1 MG/ML
1 INJECTION, SOLUTION INTRAMUSCULAR; INTRAVENOUS; SUBCUTANEOUS
Status: COMPLETED | OUTPATIENT
Start: 2017-11-06 | End: 2017-11-06

## 2017-11-06 RX ORDER — ONDANSETRON 2 MG/ML
4 INJECTION INTRAMUSCULAR; INTRAVENOUS
Status: COMPLETED | OUTPATIENT
Start: 2017-11-06 | End: 2017-11-06

## 2017-11-06 RX ADMIN — HYDROMORPHONE HYDROCHLORIDE 1 MG: 1 INJECTION, SOLUTION INTRAMUSCULAR; INTRAVENOUS; SUBCUTANEOUS at 00:31

## 2017-11-06 RX ADMIN — IOPAMIDOL 75 ML: 755 INJECTION, SOLUTION INTRAVENOUS at 01:25

## 2017-11-06 RX ADMIN — ONDANSETRON 4 MG: 2 INJECTION INTRAMUSCULAR; INTRAVENOUS at 00:31

## 2017-11-06 NOTE — ED NOTES
Pt ambulatory to/from bathroom; states she has severe pain with movement/inspiration. Pt NSR on monitor. Dr Earnestine Guzmán notified of pt complaint; orders for repeat EKG and medications given.

## 2017-11-06 NOTE — ED PROVIDER NOTES
HPI Comments: 9:06 PM Geovanna Gao is a 41 y.o. female with a history of diabetes and rheumatoid arthritis presents to ED  c/o severe upper center chest pain onset today, 4 hrs ago. Pain level 10/10. Pain doesn't travel and remains without any movement. Worse with exertion. Unsure if better when she holds her breath. No symptoms or cp in the past. Pt states she had an argument with her daughter today and was \"screaming\". Hx of diabetes, diet controlled, no meds. Hx of rheumatoid arthritis in hands and feet, 8 weeks over due for infusion. No recent surgery. No chance of pregnancy. No recent cardiac evaluation or stress test. No family hx of cardiac problems. No tobacco use. Denies abd pain, back pain, leg pain, leg swelling, fever, cough, n/v, diaphoresis. No further complaints at the moment. The history is provided by the patient. Past Medical History:   Diagnosis Date    Diabetes ([address]     Polyarthralgia 9/15    + IRWIN, trial plaquenil Dr. Eileen Cristina Rheumatoid arthritis [facility])        Past Surgical History:   Procedure Laterality Date    HX GYN      ectopic pregnancy    HX TUBAL LIGATION           Family History:   Problem Relation Age of Onset    HIV/AIDS Mother     Heart Disease Father        Social History     Social History    Marital status:      Spouse name: N/A    Number of children: N/A    Years of education: N/A     Occupational History    process cases      Social History Main Topics    Smoking status: Never Smoker    Smokeless tobacco: Never Used    Alcohol use No    Drug use: No    Sexual activity: Not on file     Other Topics Concern    Not on file     Social History Narrative         ALLERGIES: Diclofenac; Gabapentin; and Lyrica [pregabalin]    Review of Systems   Constitutional: Negative for diaphoresis and fever. HENT: Negative for congestion. Respiratory: Negative for cough and shortness of breath. Cardiovascular: Positive for chest pain.  Negative for leg swelling. Gastrointestinal: Negative for abdominal pain, nausea and vomiting. Musculoskeletal: Negative for back pain. Positive for hand pain   Positive for foot pain   Negative for leg pain    Skin: Negative for rash. Neurological: Negative for light-headedness. All other systems reviewed and are negative. Vitals:    11/06/17 0000 11/06/17 0015 11/06/17 0119 11/06/17 0130   BP: 115/72 130/76 136/79 131/71   Pulse: 74 77 70 72   Resp: 19 23  21   Temp:       SpO2: 100% 100% 100% 96%   Weight:                Physical Exam   Constitutional: She is oriented to person, place, and time. She appears well-developed. HENT:   Head: Normocephalic. Mouth/Throat: Oropharynx is clear and moist.   Eyes: Pupils are equal, round, and reactive to light. Neck: Normal range of motion. Cardiovascular: Normal rate and normal heart sounds. No murmur heard. Pulmonary/Chest: Effort normal. She has no wheezes. She has no rales. Positive for mid sternum reproducible chest wall pain    Abdominal: Soft. There is no tenderness. Musculoskeletal: Normal range of motion. She exhibits no edema. Neurological: She is alert and oriented to person, place, and time. Skin: Skin is warm and dry. Nursing note and vitals reviewed. [facility]  ED Course       Procedures      Vitals:  No data found.         Medications ordered:   Medications   0.9% sodium chloride infusion 1,000 mL (0 mL IntraVENous IV Completed 11/5/17 2300)   acetaminophen (TYLENOL) tablet 650 mg (650 mg Oral Given 11/5/17 2114)   HYDROmorphone (PF) (DILAUDID) injection 1 mg (1 mg IntraVENous Given 11/5/17 2156)   ondansetron (ZOFRAN) injection 4 mg (4 mg IntraVENous Given 11/5/17 2156)   HYDROmorphone (PF) (DILAUDID) injection 1 mg (1 mg IntraVENous Given 11/6/17 0031)   ondansetron (ZOFRAN) injection 4 mg (4 mg IntraVENous Given 11/6/17 0031)   iopamidol (ISOVUE-370) 76 % injection  mL (75 mL IntraVENous Given 11/6/17 0125)         Lab findings:  No results found for this or any previous visit (from the past 12 hour(s)). X-Ray, CT or other radiology findings or impressions:  CTA CHEST W OR W WO CONT   Final Result      XR CHEST PORT   Final Result      Interpretation per ED Physician  Impression:   Negative. Borderline cardiomegaly. Progress notes, Consult notes or additional Procedure notes:     9:54 PM- Pt pain is worsening. Pt moaning with pain. Repeat EKG ordered. Repeat markers ordered. 10:01 PM- Repeat EKG unchanged. ekg with no acute changes x2. Neg ck/trop x 2. Af, nl vitals. Cont pain, rt chest, to touch,movement. Atypical. Improved w tx. Stable for dc and close f/u. Detailed ret inst given. Disposition:  Diagnosis:   1. Chest pain, unspecified type        Disposition: home    Follow-up Information     Follow up With Details Comments Contact Info    Mckenna Tillman MD Schedule an appointment as soon as possible for a visit in 1 day  [phone]  [phone]             Discharge Medication List as of 11/6/2017  1:50 AM      START taking these medications    Details   ondansetron (ZOFRAN ODT) 4 mg disintegrating tablet Take 1 Tab by mouth every eight (8) hours as needed for Nausea. , Print, Disp-14 Tab, R-0      oxyCODONE-acetaminophen (PERCOCET) 5-325 mg per tablet Take 1 Tab by mouth every six (6) hours as needed for Pain. Max Daily Amount: 4 Tabs., Print, Disp-14 Tab, R-0         CONTINUE these medications which have NOT CHANGED    Details   topiramate (TOPAMAX) 100 mg tablet Take 1 Tab by mouth nightly., Normal, Disp-90 Tab, R-1      acetaminophen-codeine (TYLENOL #3) 300-30 mg per tablet Take 1 Tab by mouth two (2) times daily as needed for Pain. Max Daily Amount: 2 Tabs. , Phone In, Disp-40 Tab, R-0      ibuprofen (MOTRIN) 800 mg tablet 800 mg., Historical Med      atorvastatin (LIPITOR) 10 mg tablet TK 1 T PO D, Historical Med, R-11      aspirin delayed-release 81 mg tablet Take 81 mg by mouth daily. , Historical Med, R-5      folic acid (FOLVITE) 1 mg tablet Take 1 mg by mouth daily. , Historical Med, R-2      hydroxychloroquine (PLAQUENIL) 200 mg tablet Take 200 mg by mouth daily. , Historical Med, R-1      methotrexate (RHEUMATREX) 2.5 mg tablet Take 2.5 mg by mouth every Monday. Take 7 pills  Indications: RHEUMATOID ARTHRITIS, Historical Med, R-0      ERGOCALCIFEROL, VITAMIN D2, (VITAMIN D2 PO) Take  by mouth daily. , Historical Med               Scribe Carolyn Hook (Aj) acting as a scribe for and in the presence of Yenny Pathak MD      November 05, 2017 at 9:21 PM       Provider Attestation:      I personally performed the services described in the documentation, reviewed the documentation, as recorded by the scribe in my presence, and it accurately and completely records my words and actions.  November 05, 2017 at 9:21 Elizabeth Guaman MD

## 2017-11-06 NOTE — ED NOTES
Treva Estrella is a 41 y.o. female that was discharged in good condition. The patients diagnosis, condition and treatment were explained to  patient and aftercare instructions were given. The patient verbalized understanding. Patient armband removed and shredded.

## 2017-11-06 NOTE — ED TRIAGE NOTES
Patient reports chest pain starting x 1 day without relief. Patient states hx of RA and having missed last infusion.  Patient states that chest pain is mid sternal.

## 2017-11-06 NOTE — DISCHARGE INSTRUCTIONS
Return for pain, fever, shortness of breath, vomiting, decreased fluid intake, weakness, numbness, dizziness, or any change or concerns. Musculoskeletal Chest Pain: Care Instructions  Your Care Instructions    Chest pain is not always a sign that something is wrong with your heart or that you have another serious problem. The doctor thinks your chest pain is caused by strained muscles or ligaments, inflamed chest cartilage, or another problem in your chest, rather than by your heart. You may need more tests to find the cause of your chest pain. Follow-up care is a key part of your treatment and safety. Be sure to make and go to all appointments, and call your doctor if you are having problems. It's also a good idea to know your test results and keep a list of the medicines you take. How can you care for yourself at home? · Take pain medicines exactly as directed. ¨ If the doctor gave you a prescription medicine for pain, take it as prescribed. ¨ If you are not taking a prescription pain medicine, ask your doctor if you can take an over-the-counter medicine. · Rest and protect the sore area. · Stop, change, or take a break from any activity that may be causing your pain or soreness. · Put ice or a cold pack on the sore area for 10 to 20 minutes at a time. Try to do this every 1 to 2 hours for the next 3 days (when you are awake) or until the swelling goes down. Put a thin cloth between the ice and your skin. · After 2 or 3 days, apply a heating pad set on low or a warm cloth to the area that hurts. Some doctors suggest that you go back and forth between hot and cold. · Do not wrap or tape your ribs for support. This may cause you to take smaller breaths, which could increase your risk of lung problems. · Mentholated creams such as Bengay or Icy Hot may soothe sore muscles. Follow the instructions on the package. · Follow your doctor's instructions for exercising.   · Gentle stretching and massage may help you get better faster. Stretch slowly to the point just before pain begins, and hold the stretch for at least 15 to 30 seconds. Do this 3 or 4 times a day. Stretch just after you have applied heat. · As your pain gets better, slowly return to your normal activities. Any increased pain may be a sign that you need to rest a while longer. When should you call for help? Call 911 anytime you think you may need emergency care. For example, call if:  ? · You have chest pain or pressure. This may occur with:  ¨ Sweating. ¨ Shortness of breath. ¨ Nausea or vomiting. ¨ Pain that spreads from the chest to the neck, jaw, or one or both shoulders or arms. ¨ Dizziness or lightheadedness. ¨ A fast or uneven pulse. After calling 911, chew 1 adult-strength aspirin. Wait for an ambulance. Do not try to drive yourself. ? · You have sudden chest pain and shortness of breath, or you cough up blood. ?Call your doctor now or seek immediate medical care if:  ? · You have any trouble breathing. ? · Your chest pain gets worse. ? · Your chest pain occurs consistently with exercise and is relieved by rest.   ? Watch closely for changes in your health, and be sure to contact your doctor if:  ? · Your chest pain does not get better after 1 week. Where can you learn more? Go to http://mat-jena.info/. Enter V293 in the search box to learn more about \"Musculoskeletal Chest Pain: Care Instructions. \"  Current as of: March 20, 2017  Content Version: 11.4  © 9650-8516 [employer]. Care instructions adapted under license by [employer] (which disclaims liability or warranty for this information). If you have questions about a medical condition or this instruction, always ask your healthcare professional. [place] [address] any warranty or liability for your use of this information.              Chest Pain: Care Instructions  Your Care Instructions    There are many things that can cause chest pain. Some are not serious and will get better on their own in a few days. But some kinds of chest pain need more testing and treatment. Your doctor may have recommended a follow-up visit in the next 8 to 12 hours. If you are not getting better, you may need more tests or treatment. Even though your doctor has released you, you still need to watch for any problems. The doctor carefully checked you, but sometimes problems can develop later. If you have new symptoms or if your symptoms do not get better, get medical care right away. If you have worse or different chest pain or pressure that lasts more than 5 minutes or you passed out (lost consciousness), call 911 or seek other emergency help right away. A medical visit is only one step in your treatment. Even if you feel better, you still need to do what your doctor recommends, such as going to all suggested follow-up appointments and taking medicines exactly as directed. This will help you recover and help prevent future problems. How can you care for yourself at home? · Rest until you feel better. · Take your medicine exactly as prescribed. Call your doctor if you think you are having a problem with your medicine. · Do not drive after taking a prescription pain medicine. When should you call for help? Call 911 if:  ? · You passed out (lost consciousness). ? · You have severe difficulty breathing. ? · You have symptoms of a heart attack. These may include:  ¨ Chest pain or pressure, or a strange feeling in your chest.  ¨ Sweating. ¨ Shortness of breath. ¨ Nausea or vomiting. ¨ Pain, pressure, or a strange feeling in your back, neck, jaw, or upper belly or in one or both shoulders or arms. ¨ Lightheadedness or sudden weakness. ¨ A fast or irregular heartbeat. After you call 911, the  may tell you to chew 1 adult-strength or 2 to 4 low-dose aspirin. Wait for an ambulance. Do not try to drive yourself. ?Call your doctor today if:  ? · You have any trouble breathing. ? · Your chest pain gets worse. ? · You are dizzy or lightheaded, or you feel like you may faint. ? · You are not getting better as expected. ? · You are having new or different chest pain. Where can you learn more? Go to http://mat-jena.info/. Enter A120 in the search box to learn more about \"Chest Pain: Care Instructions. \"  Current as of: March 20, 2017  Content Version: 11.4  © 9757-3042 [employer]. Care instructions adapted under license by [employer] (which disclaims liability or warranty for this information). If you have questions about a medical condition or this instruction, always ask your healthcare professional. [address] any warranty or liability for your use of this information.

## 2017-11-06 NOTE — ED NOTES
I have reviewed discharge instructions with the patient. The patient verbalized understanding.   Pts family member at bedside to drive pt home

## 2017-11-08 LAB
ATRIAL RATE: 74 BPM
ATRIAL RATE: 80 BPM
CALCULATED P AXIS, ECG09: 55 DEGREES
CALCULATED P AXIS, ECG09: 64 DEGREES
CALCULATED R AXIS, ECG10: -2 DEGREES
CALCULATED R AXIS, ECG10: 1 DEGREES
CALCULATED T AXIS, ECG11: 33 DEGREES
CALCULATED T AXIS, ECG11: 42 DEGREES
DIAGNOSIS, 93000: NORMAL
DIAGNOSIS, 93000: NORMAL
P-R INTERVAL, ECG05: 166 MS
P-R INTERVAL, ECG05: 182 MS
Q-T INTERVAL, ECG07: 398 MS
Q-T INTERVAL, ECG07: 422 MS
QRS DURATION, ECG06: 84 MS
QRS DURATION, ECG06: 92 MS
QTC CALCULATION (BEZET), ECG08: 459 MS
QTC CALCULATION (BEZET), ECG08: 468 MS
VENTRICULAR RATE, ECG03: 74 BPM
VENTRICULAR RATE, ECG03: 80 BPM

## 2018-05-25 ENCOUNTER — HOSPITAL ENCOUNTER (OUTPATIENT)
Dept: NON INVASIVE DIAGNOSTICS | Age: 48
Discharge: HOME | End: 2018-05-25
Attending: INTERNAL MEDICINE
Payer: COMMERCIAL

## 2018-05-25 DIAGNOSIS — E78.5 HYPERLIPIDEMIA: ICD-10-CM

## 2018-05-25 DIAGNOSIS — R07.9 CHEST PAIN: ICD-10-CM

## 2018-05-25 DIAGNOSIS — E11.9 DM TYPE 2 (DIABETES MELLITUS, TYPE 2) (HCC): ICD-10-CM

## 2018-05-25 LAB
ATTENDING PHYSICIAN, CST07: NORMAL
DIAGNOSIS, 93000: NORMAL
DUKE TM SCORE RESULT, CST14: NORMAL
DUKE TREADMILL SCORE, CST13: NORMAL
ECG INTERP BEFORE EX, CST11: NORMAL
ECG INTERP DURING EX, CST12: NORMAL
FUNCTIONAL CAPACITY, CST17: NORMAL
KNOWN CARDIAC CONDITION, CST08: NORMAL
MAX. DIASTOLIC BP, CST04: 62 MMHG
MAX. HEART RATE, CST05: 157 BPM
MAX. SYSTOLIC BP, CST03: 146 MMHG
OVERALL BP RESPONSE TO EXERCISE, CST16: NORMAL
OVERALL HR RESPONSE TO EXERCISE, CST15: NORMAL
PEAK EX METS, CST10: 9.3 METS
PROTOCOL NAME, CST01: NORMAL
TEST INDICATION, CST09: NORMAL

## 2018-05-25 PROCEDURE — 93017 CV STRESS TEST TRACING ONLY: CPT

## 2018-05-26 NOTE — PROCEDURES
[address]  MR#: [id number]  : 1970  ACCOUNT #: [de-identified]   DATE OF SERVICE: 2018    ORDERING PHYSICIAN:  Dr. Souza Plush:  Chest pain in the setting of diabetes mellitus, rule out coronary ischemia. EKG RESTING:  Normal sinus rhythm, rate 71. There was a Q-wave in V2 which could be positional, but might suggest past septal infarction. Otherwise, the tracing was within normal limits. PROCEDURE PROTOCOL:  Standard Bakari. TOTAL EXERCISE TIME:  6 minutes 46 seconds. ACHIEVED HEART RATE:  157, 89% of predicted maximum    REASON FOR TERMINATION:  Fatigue and dyspnea. ABNORMALITIES:    SYMPTOMS:  No chest pain. ST CHANGES:  None. BLOOD PRESSURE RESPONSE:  Normal.    ARRHYTHMIAS:  None. SUMMARY:  1.   Negative maximal stress test.  2.  This was a low-risk exercise treadmill test.      Maldonado Hilario DO       ES / HN  D: 2018 08:50     T: 2018 09:18  JOB #: [id number]

## 2018-07-09 ENCOUNTER — APPOINTMENT (OUTPATIENT)
Dept: PHYSICAL THERAPY | Age: 48
End: 2018-07-09

## 2019-11-18 ENCOUNTER — HOSPITAL ENCOUNTER (OUTPATIENT)
Dept: PHYSICAL THERAPY | Age: 49
Discharge: HOME | End: 2019-11-18
Payer: COMMERCIAL

## 2019-11-18 PROCEDURE — 97110 THERAPEUTIC EXERCISES: CPT

## 2019-11-18 PROCEDURE — 97163 PT EVAL HIGH COMPLEX 45 MIN: CPT

## 2019-11-18 PROCEDURE — 97530 THERAPEUTIC ACTIVITIES: CPT

## 2019-11-18 NOTE — PROGRESS NOTES
PT DAILY TREATMENT NOTE/KNEE EVAL 10-18    Patient Name: Cole Hutchins  Date:2019  : 1970  [x]  Patient  Verified  Payor: Krishna Huff / Plan: [place] [employer] / Product Type: HMO /    In time:306  Out time:403  Total Treatment Time (min): 62  Visit #: 1 of 8    Medicare/BCBS Only   Total Timed Codes (min):  57 1:1 Treatment Time:  62     Treatment Area: OA (osteoarthritis) of knee [M17.10]    SUBJECTIVE  Pain Level (0-10 scale): hip: worst: 5, lowest: 0 currently:1  Knees: worst 7, lowest: 0 current: 0  [x]constant [x]intermittent [x]improving []worsening []no change since onset    Any medication changes, allergies to medications, adverse drug reactions, diagnosis change, or new procedure performed?: [x] No    [] Yes (see summary sheet for update)  Subjective functional status/changes:     PLOF: I with ADLs, driving, gym 4x/day (cardio/bike, free weights, static stretches), other 3 days cardio  Limitations to PLOF: pain  Mechanism of Injury: ~ diagnosed with RA; got a second opinion,  dislocated patellar in right knee  Current symptoms/Complaints: B hips and R knee, pain in hips sits on outside, hip flexors soreness; back pain has improved since starting exercises, denies numbness/tingling, denies difficulty with bowel/bladder, right knee used to have catching, feels clicking, occasionally buckling, pain in front  Aggravating: prolong sitting, stationary bike, stairs  Previous Treatment/Compliance:  B hips cortisol shot 2019, PT (knee)  PMHx/Surgical Hx: OA, hx of back pain, DDD, DM, ectopic pregnancy   Work Hx: sitting at desk  Living Situation: apartment, 14 stairs, son, granddaughter  Pt Goals: \"decrease pain and knowledge on how to manage condition\"  Barriers: []pain []financial []time []transportation []other  Motivation: good  Substance use: []Alcohol []Tobacco []other:   FABQ Score: []low []elevate  Cognition: A & O x 4    Other:    OBJECTIVE/EXAMINATION  Domestic Life: see above  Activity/Recreational Limitations: pain   Mobility: no AD  Self Care: I with self care      22 min [x]Eval                  []Re-Eval       10 min Therapeutic Exercise:  [x] See flow sheet : HEP   Rationale: increase ROM, increase strength, improve coordination and increase proprioception to improve the patients ability to complete functional task. 25 min Therapeutic Activity:  [x]  See flow sheet :   Rationale: education on condition/anatomy, education on expectation of PT, education on exercise/rest  to improve the patients ability to complete job related task with decreased pain.            With   [x] TE   [x] TA   [] neuro   [] other: Patient Education: [x] Review HEP    [] Progressed/Changed HEP based on:   [] positioning   [] body mechanics   [] transfers   [] heat/ice application    [] other:      Other Objective/Functional Measures:     Physical Therapy Evaluation - Knee      Gait:  [] Normal    [] Abnormal    [x] Antalgic    [] NWB    Device:    Describe: wide CAYLA, slow cautious gait    ROM / Strength      Strength    Right Left        Hip  Flexion (120) 5 5    Extension 4 4    Abduction 4 4    Adduction  4 4    IR 4+ p 5    ER 5 5   Knee  Flexion (135) 5 5    Extension  4 p 5     Ankle  Plantarflexion (50) 5 5    Dorsiflexion (20) 5 5    Inversion (35)      Eversion (15)       AROM: LE WFL    Flexibility: [] Unable to assess at this time  Hamstrings:    (L) Tightness= [] WNL   [x] Min   [] Mod   [] Severe    (R) Tightness= [] WNL   [x] Min   [] Mod   [] Severe  Quadriceps:    (L) Tightness= [] WNL   [] Min   [x] Mod   [] Severe    (R) Tightness= [] WNL   [] Min   [x] Mod   [] Severe  Gastroc:      (L) Tightness= [] WNL   [] Min   [x] Mod   [] Severe    (R) Tightness= [] WNL   [] Min   [x] Mod   [] Severe  Other:    Palpation:   Neg/Pos  Neg/Pos  Neg/Pos   Joint Line  Quad tendon  Patellar ligament    Patella  Fibular head  Pes Anserinus  B +   Tibial tubercle  Hamstring tendons  Infrapatellar fat pad      Optional Tests:  Patellar Positioning (Static)   []L []R Normal []L [x]R Lateral   []L []R [place]      []L []R Medial   []L []R [place]    Patellar Tracking   []L []R Glide (Lat)   []L [x]R Tilt (Lat)     []L []R Glide (Med)  []L []R Tilt (Med)      []L []R Tile (Inf)     Patellar Mobility  WFL mobility; pain with superior/inferior glides on R; crepitus appreciated    Lachmans  [] Neg    [] Pos Posterior Drawer [x] Neg    [] Pos  Pivot Shift  [] Neg    [] Pos Posterior Sag  [] Neg    [] Pos  MAXIMILIAN   [] Neg    [] Pos Carrie's Test [] Neg    [] Pos  ALRI   [] Neg    [] Pos Squat   [] Neg    [] Pos  Valgus@ 0 Degrees [x] Neg    [] Pos Chet-Krsi [] Neg    [] Pos  Valgus@ 30 Degrees [] Neg    [] Pos Patellar Apprehension [] Neg    [] Pos  Varus@ 0 Degrees [x] Neg    [] Pos Palomino's Compression [] Neg    [] Pos  Varus@ 30 Degrees [] Neg    [] Pos Ely's Test  [] Neg    [] Pos  Apley's Compression [] Neg    [] Pos Fatuma's Test  [] Neg    [] Pos  Apley's Distraction [] Neg    [] Pos Stroke Test  [] Neg    [] Pos   Anterior Drawer [x] Neg    [] Pos Fluctuation Test [] Neg    [] Pos  Other:                  [] Neg    [] Pos                 Other tests/comments:   Yony: +    Pain Level (0-10 scale) post treatment: same    ASSESSMENT/Changes in Function: Pt is a 55 y/o female presenting to outpatient physical therapy with complaints of B hip and right knee pain. After PT evaluation, findings and symptoms are consistent with OA causing muscle weakness and pain. Pt is a good candidate for skilled PT interventions with plan to progress and modify therapeutic interventions to address strength deficits, ROM deficits, endurance deficits, mobility deficits, soft tissue restrictions, balance training, stair training, and patient education to im  Written HEP was completed and hardcopy was given to patient to complete daily at home; pt verbalized understanding.      [x]  See Plan of Care  []  See progress note/recertification  []  See Discharge Summary         Progress towards goals / Updated goals:  Short term goals to be completed in 2 weeks  1. Pt will be compliant and I with HEP to help reduce pain and improve abilities to complete ADLs. EVAL: written hardcopy given and initiated exercises   CURRENT:  2. Pt will report no higher than 4/10 right knee pain to improve patient's ability to complete prolong sitting at desk. EVAL: 7/10   CURRENT:  3. 3. Pt will report no higher than 3/10 B hip pain to improve patient's ability to complete stairs to get to apartment. EVAL: 5/10   CURRENT:    Long term goals to be completed in 4 weeks  1. Pt will demonstrate 71 FOTO score to improve abilities to functional activities. EVAL: 69   CURRENT:  2. Pt will report no higher than 1/10  B hip pain to improve patient's ability to complete stairs to get to apartment. EVAL: 5/10   CURRENT:   3. Pt will report no higher than 2/10 right knee pain to improve patient's ability to complete prolong sitting at desk. EVAL: 7/10   CURRENT:  4. Pt will demonstrate 5/5 strength in B LE to improve patient's ability to functional activities.    EVAL: 4/5 B hip ext/abd, right knee ext   CURRENT:      PLAN  [x]  Upgrade activities as tolerated     [x]  Continue plan of care  [x]  Update interventions per flow sheet       []  Discharge due to:_  []  Other:_      Catalino Kuldeep 11/18/2019  3:01 PM

## 2019-11-18 NOTE — PROGRESS NOTES
[facility]  [address] [facility], [address] [place] [address], [facility] [address]  [phone] fax      Plan of Care/ Statement of Necessity for Physical Therapy Services    Patient name: Micehlle Funes Start of Care: 2019   Referral source: Joey DO Abelardo : 1970    Medical Diagnosis: OA (osteoarthritis) of knee [M17.10]  Payor: Annette Michael / Plan: [employer] / Product Type: HMO /  Onset Date:    Treatment Diagnosis: B hip pain, R knee pain   Prior Hospitalization: see medical history Provider#: [id number]   Medications: Verified on Patient summary List    Comorbidities: OA, hx of back pain, DDD, DM, ectopic pregnancy    Prior Level of Function:  I with ADLs, driving, gym 4x/day (cardio/bike, free weights, static stretches), other 3 days cardio     The Plan of Care and following information is based on the information from the initial evaluation. Assessment/ key information: Pt is a 53 y/o female presenting to outpatient physical therapy with complaints of B hip and right knee pain. After PT evaluation, findings and symptoms are consistent with OA causing muscle weakness and pain. Pt is a good candidate for skilled PT interventions with plan to progress and modify therapeutic interventions to address strength deficits, ROM deficits, endurance deficits, mobility deficits, soft tissue restrictions, balance training, stair training, and patient education to im  Written HEP was completed and hardcopy was given to patient to complete daily at home; pt verbalized understanding.   Evaluation Complexity History HIGH Complexity :3+ comorbidities / personal factors will impact the outcome/ POC ; Examination HIGH Complexity : 4+ Standardized tests and measures addressing body structure, function, activity limitation and / or participation in recreation  ;Presentation LOW Complexity : Stable, uncomplicated  ;Clinical Decision Making MEDIUM Complexity : FOTO score of 26-74  Overall Complexity Rating: HIGH   Problem List: pain affecting function, decrease strength, impaired gait/ balance, decrease ADL/ functional abilitiies, decrease activity tolerance, decrease flexibility/ joint mobility and decrease transfer abilities   Treatment Plan may include any combination of the following: Therapeutic exercise, Therapeutic activities, Neuromuscular re-education, Physical agent/modality, Gait/balance training, Manual therapy, Patient education, Self Care training, Functional mobility training, Home safety training and Stair training  Patient / Family readiness to learn indicated by: asking questions, trying to perform skills and interest  Persons(s) to be included in education: patient (P)  Barriers to Learning/Limitations: None  Patient Goal (s): decrease pain and knowledge on how to manage condition  Patient Self Reported Health Status: good  Rehabilitation Potential: good    Short term goals to be completed in 2 weeks  1. Pt will be compliant and I with HEP to help reduce pain and improve abilities to complete ADLs. EVAL: written hardcopy given and initiated exercises              CURRENT:  2. Pt will report no higher than 4/10 right knee pain to improve patient's ability to complete prolong sitting at desk. EVAL: 7/10              CURRENT:  3. 3. Pt will report no higher than 3/10 B hip pain to improve patient's ability to complete stairs to get to apartment. EVAL: 5/10              CURRENT:     Long term goals to be completed in 4 weeks  1. Pt will demonstrate 71 FOTO score to improve abilities to functional activities. EVAL: 69              CURRENT:  2. Pt will report no higher than 1/10  B hip pain to improve patient's ability to complete stairs to get to apartment. EVAL: 5/10              CURRENT:   3.  Pt will report no higher than 2/10 right knee pain to improve patient's ability to complete prolong sitting at desk.              EVAL: 7/10              CURRENT:  4. Pt will demonstrate 5/5 strength in B LE to improve patient's ability to functional activities. EVAL: 4/5 B hip ext/abd, right knee ext              CURRENT:  Frequency / Duration: Patient to be seen 2 times per week for 4 weeks. Patient/ Caregiver education and instruction: Diagnosis, prognosis, self care, activity modification and exercises   [x]  Plan of care has been reviewed with SHO Jaime 11/18/2019 5:56 PM  ________________________________________________________________________    I certify that the above Therapy Services are being furnished while the patient is under my care. I agree with the treatment plan and certify that this therapy is necessary.     Physician's Signature:____________Date:_________TIME:________    [facility], Date and Time must be completed for valid certification **      Please sign and return to In . Payton [address], [address] [place] [address], [address] [place] [address],[facility] 300 [phone] fax

## 2019-12-02 ENCOUNTER — HOSPITAL ENCOUNTER (OUTPATIENT)
Dept: PHYSICAL THERAPY | Age: 49
Discharge: HOME | End: 2019-12-02
Payer: COMMERCIAL

## 2019-12-02 PROCEDURE — 97110 THERAPEUTIC EXERCISES: CPT

## 2019-12-02 NOTE — PROGRESS NOTES
PT DAILY TREATMENT NOTE 10-18    Patient Name: John Lou  Date:2019  : 1970  [x]  Patient  Verified  Payor: Jona Hernandez / Plan: [employer] / Product Type: HMO /    In time:7:38  Out time:8:05  Total Treatment Time (min): 27  Visit #: 2 of 8    Medicare/BCBS Only   Total Timed Codes (min):   1:1 Treatment Time:         Treatment Area: OA (osteoarthritis) of knee [M17.10]  Trochanteric bursitis [M70.60]    SUBJECTIVE  Pain Level (0-10 scale): 0  Any medication changes, allergies to medications, adverse drug reactions, diagnosis change, or new procedure performed?: [x] No    [] Yes (see summary sheet for update)  Subjective functional status/changes:   [] No changes reported  \"Not  Really any pain but soreness. \"    OBJECTIVE    Modality rationale: decrease edema, decrease inflammation, decrease pain and increase tissue extensibility to improve the patients ability to perform ADL    Min Type Additional Details    [] Estim:  []Unatt       []IFC  []Premod                        []Other:  []w/ice   []w/heat  Position:  Location:    [] Estim: []Att    []TENS instruct  []NMES                    []Other:  []w/US   []w/ice   []w/heat  Position:  Location:    []  Traction: [] Cervical       []Lumbar                       [] Prone          []Supine                       []Intermittent   []Continuous Lbs:  [] before manual  [] after manual    []  Ultrasound: []Continuous   [] Pulsed                           []1MHz   []3MHz W/cm2:  Location:    []  Iontophoresis with dexamethasone         Location: [] Take home patch   [] In clinic    []  Ice     []  heat  []  Ice massage  []  Laser   []  Anodyne Position:  Location:    []  Laser with stim  []  Other:  Position:  Location:    []  Vasopneumatic Device Pressure:       [] lo [] med [] hi   Temperature: [] lo [] med [] hi   [x] Skin assessment post-treatment:  [x]intact []redness- no adverse reaction    []redness  adverse reaction:      min []Eval []Re-Eval       27 min Therapeutic Exercise:  [x] See flow sheet :   Rationale: increase ROM and increase strength to improve the patients ability to perform ADL      min Therapeutic Activity:  []  See flow sheet :   Rationale: increase ROM and increase strength  to improve the patients ability to perform ADL       min Neuromuscular Re-education:  []  See flow sheet :   Rationale:   to improve the patients ability to      min Manual Therapy:    Rationale: decrease pain, increase ROM, increase tissue extensibility and decrease edema  to perform ADL      min Gait Training:  ___ feet with ___ device on level surfaces with ___ level of assist   Rationale: With   [x] TE   [] TA   [] neuro   [] other: Patient Education: [x] Review HEP    [] Progressed/Changed HEP based on:   [] positioning   [] body mechanics   [] transfers   [] heat/ice application    [] other:      Other Objective/Functional Measures:      Pain Level (0-10 scale) post treatment: 0    ASSESSMENT/Changes in Function: pt required  Cues on performing there ex correctly. Discussed with pt on importance on not performing kick boxing. Also instruct pt to perform seated marches at her desk to decrease tightness. Patient will continue to benefit from skilled PT services to address functional mobility deficits, address ROM deficits, address strength deficits, analyze and address soft tissue restrictions and analyze and cue movement patterns to attain remaining goals. [x]  See Plan of Care  []  See progress note/recertification  []  See Discharge Summary         Progress towards goals / Updated goals:  1. Pt will be compliant and I with HEP to help reduce pain and improve abilities to complete ADLs.             EVAL: written hardcopy given and initiated exercises              CURRENT:progressing 12/2/19  2.  Pt will report no higher than 4/10 right knee pain to improve patient's ability to complete prolong sitting at desk.  Satnam Miranda: 7/10              CURRENT:  3. 3. Pt will report no higher than 3/10 B hip pain to improve patient's ability to complete stairs to get to apartment.              EVAL: 5/10              CURRENT:     Long term goals to be completed in 4 weeks  1. Pt will demonstrate 71 FOTO score to improve abilities to functional activities.             EVAL: 69              CURRENT:  2. Pt will report no higher than 1/10  B hip pain to improve patient's ability to complete stairs to get to apartment.  Quinonez Bibles: 5/10              CURRENT:   3. Pt will report no higher than 2/10 right knee pain to improve patient's ability to complete prolong sitting at desk.  Quinonez Bibles: 7/10              CURRENT:  4. Pt will demonstrate 5/5 strength in B LE to improve patient's ability to functional activities.               EVAL: 4/5 B hip ext/abd, right knee ext              CURRENT:    PLAN  [x]  Upgrade activities as tolerated     []  Continue plan of care  []  Update interventions per flow sheet       []  Discharge due to:_  []  Other:_      Ariana Steven PTA 12/2/2019  7:43 AM    Future Appointments   Date Time Provider Jus Garcia   12/6/2019  8:30 AM Beni Houston [facility]   12/9/2019  7:30 AM Ariana Steven PTA [facility]   12/11/2019  7:30 AM Ariana Steven PTA [facility]   12/17/2019  7:30 AM Aliyah Mancera PT [facility]   12/20/2019  7:30 AM Taniya Brewer PTA [facility]   12/24/2019  7:30 AM Aliyah Mancera PT [facility]   12/27/2019  8:00 AM Aliyah Mancera PT [facility]   12/31/2019  7:30 AM Aliyah Mancera PT [facility]

## 2019-12-06 ENCOUNTER — HOSPITAL ENCOUNTER (OUTPATIENT)
Dept: PHYSICAL THERAPY | Age: 49
Discharge: HOME | End: 2019-12-06
Payer: COMMERCIAL

## 2019-12-06 PROCEDURE — 97110 THERAPEUTIC EXERCISES: CPT

## 2019-12-06 PROCEDURE — 97530 THERAPEUTIC ACTIVITIES: CPT

## 2019-12-06 NOTE — PROGRESS NOTES
PT DAILY TREATMENT NOTE 10-18    Patient Name: Mayi Mckeon  Date:2019  : 1970  [x]  Patient  Verified  Payor: Shannon Wills / Plan: [employer]MO / Product Type: HMO /    In time:  8:24 Out time 8:58  Total Treatment Time (min): 34  Visit #: 3 of 8    Medicare/BCBS Only   Total Timed Codes (min):  1:1 Treatment Time:         Treatment Area: OA (osteoarthritis) of knee [M17.10]  Trochanteric bursitis, unspecified hip [M70.60]    SUBJECTIVE  Pain Level (0-10 scale): 1  Any medication changes, allergies to medications, adverse drug reactions, diagnosis change, or new procedure performed?: [x] No    [] Yes (see summary sheet for update)  Subjective functional status/changes:   [] No changes reported  \"Rachell  Been doing  My stretches. \" \"I felt  Pain once I got out of the car  At back of right leg. \"    OBJECTIVE    Modality rationale: decrease edema, decrease inflammation, decrease pain and increase tissue extensibility to improve the patients ability to perform ADL    Min Type Additional Details    [] Estim:  []Unatt       []IFC  []Premod                        []Other:  []w/ice   []w/heat  Position:  Location:    [] Estim: []Att    []TENS instruct  []NMES                    []Other:  []w/US   []w/ice   []w/heat  Position:  Location:    []  Traction: [] Cervical       []Lumbar                       [] Prone          []Supine                       []Intermittent   []Continuous Lbs:  [] before manual  [] after manual    []  Ultrasound: []Continuous   [] Pulsed                           []1MHz   []3MHz W/cm2:  Location:    []  Iontophoresis with dexamethasone         Location: [] Take home patch   [] In clinic    []  Ice     []  heat  []  Ice massage  []  Laser   []  Anodyne Position:  Location:    []  Laser with stim  []  Other:  Position:  Location:    []  Vasopneumatic Device Pressure:       [] lo [] med [] hi   Temperature: [] lo [] med [] hi   [x] Skin assessment post-treatment:  [x]intact []redness- no adverse reaction    []redness  adverse reaction:      min []Eval                  []Re-Eval       24 min Therapeutic Exercise:  [x] See flow sheet :   Rationale: increase ROM and increase strength to improve the patients ability to perform ADL     10 min Therapeutic Activity:  [x]  See flow sheet :   Rationale: increase ROM and increase strength  to improve the patients ability to perform ADL       min Neuromuscular Re-education:  []  See flow sheet :   Rationale:   to improve the patients ability to      min Manual Therapy:     Rationale: decrease pain, increase ROM, increase tissue extensibility and decrease edema  to perform ADL      min Gait Training:  ___ feet with ___ device on level surfaces with ___ level of assist   Rationale: With   [x] TE   [] TA   [] neuro   [] other: Patient Education: [x] Review HEP    [] Progressed/Changed HEP based on:   [] positioning   [] body mechanics   [] transfers   [] heat/ice application    [] other:      Other Objective/Functional Measures:      Pain Level (0-10 scale) post treatment: 0    ASSESSMENT/Changes in Function: Discussed  With pt on importance of performing HEP 2 x day. Pt responded fairly well to each there ex. Patient will continue to benefit from skilled PT services to address functional mobility deficits, address ROM deficits, address strength deficits, analyze and address soft tissue restrictions and analyze and cue movement patterns to attain remaining goals. [x]  See Plan of Care  []  See progress note/recertification  []  See Discharge Summary         Progress towards goals / Updated goals:  1. Pt will be compliant and I with HEP to help reduce pain and improve abilities to complete ADLs.             EVAL: written hardcopy given and initiated exercises              CURRENT:progressing 12/2/19  2.  Pt will report no higher than 4/10 right knee pain to improve patient's ability to complete prolong sitting at desk.  Hanna Jointer: 7/10              CURRENT: progressing 12/6/19  3. 3. Pt will report no higher than 3/10 B hip pain to improve patient's ability to complete stairs to get to apartment.              EVAL: 5/10              CURRENT:     Long term goals to be completed in 4 weeks  1. Pt will demonstrate 71 FOTO score to improve abilities to functional activities.             EVAL: 69              CURRENT:  2. Pt will report no higher than 1/10  B hip pain to improve patient's ability to complete stairs to get to apartment.  Julisa Burch: 5/10              CURRENT:   3. Pt will report no higher than 2/10 right knee pain to improve patient's ability to complete prolong sitting at desk.  Julisa Burch: 7/10              CURRENT:  4. Pt will demonstrate 5/5 strength in B LE to improve patient's ability to functional activities.               EVAL: 4/5 B hip ext/abd, right knee ext              CURRENT:    PLAN  []  Upgrade activities as tolerated     [x]  Continue plan of care  []  Update interventions per flow sheet       []  Discharge due to:_  []  Other:_      Ariana Steven PTA 12/6/2019  8:54 AM    Future Appointments   Date Time Provider Jus Garcia   12/9/2019  7:30 AM Margret Hanna PTA [facility]   12/11/2019  7:30 AM Ar Devlin PTA [facility]   12/17/2019  7:30 AM Anotine Traylor PT [facility]   12/20/2019  7:30 AM Ar Devlin PTA [facility]   12/24/2019  7:30 AM Antoine Traylor, PT [facility]   12/27/2019  8:00 AM Antoine Traylor PT [facility]   12/31/2019  7:30 AM Antoine Traylor PT [facility]

## 2019-12-09 ENCOUNTER — HOSPITAL ENCOUNTER (OUTPATIENT)
Dept: PHYSICAL THERAPY | Age: 49
Discharge: HOME | End: 2019-12-09
Payer: COMMERCIAL

## 2019-12-09 PROCEDURE — 97112 NEUROMUSCULAR REEDUCATION: CPT

## 2019-12-09 PROCEDURE — 97110 THERAPEUTIC EXERCISES: CPT

## 2019-12-09 NOTE — PROGRESS NOTES
PT DAILY TREATMENT NOTE 10-18    Patient Name: Miguel Weaver  Date:2019  : 1970  [x]  Patient  Verified  Payor: Rd Rodriguez / Plan: [employer] / Product Type: HMO /    In time:735  Out time:815  Total Treatment Time (min): 40  Visit #: 4 of 8    Medicare/BCBS Only   Total Timed Codes (min):   1:1 Treatment Time:         Treatment Area: OA (osteoarthritis) of knee [M17.10]  Trochanteric bursitis, unspecified hip [M70.60]    SUBJECTIVE  Pain Level (0-10 scale): 1  Any medication changes, allergies to medications, adverse drug reactions, diagnosis change, or new procedure performed?: [x] No    [] Yes (see summary sheet for update)  Subjective functional status/changes:   [] No changes reported  Patient reported slight pain nateriolateral aspect of right hip    OBJECTIVE    Modality rationale: PD   Min Type Additional Details    [] Estim:  []Unatt       []IFC  []Premod                        []Other:  []w/ice   []w/heat  Position:  Location:    [] Estim: []Att    []TENS instruct  []NMES                    []Other:  []w/US   []w/ice   []w/heat  Position:  Location:    []  Traction: [] Cervical       []Lumbar                       [] Prone          []Supine                       []Intermittent   []Continuous Lbs:  [] before manual  [] after manual    []  Ultrasound: []Continuous   [] Pulsed                           []1MHz   []3MHz W/cm2:  Location:    []  Iontophoresis with dexamethasone         Location: [] Take home patch   [] In clinic    []  Ice     []  heat  []  Ice massage  []  Laser   []  Anodyne Position:  Location:    []  Laser with stim  []  Other:  Position:  Location:    []  Vasopneumatic Device Pressure:       [] lo [] med [] hi   Temperature: [] lo [] med [] hi   [] Skin assessment post-treatment:  []intact []redness- no adverse reaction    []redness  adverse reaction:         30 min Therapeutic Exercise:  [x] See flow sheet :   Rationale: increase ROM and increase strength to improve the patients ability to perform ADLs      10 min Neuromuscular Re-education:  [x]  See flow sheet : bosu   Rationale: improve coordination, improve balance and increase proprioception  to improve the patients ability to perform ADLs         With   [] TE   [] TA   [] neuro   [] other: Patient Education: [x] Review HEP    [] Progressed/Changed HEP based on:   [] positioning   [] body mechanics   [] transfers   [] heat/ice application    [] other:      Other Objective/Functional Measures: progressed therex: increased reps and resistance 3way hip, bosu step up, added hip flexor stretch on Right     Pain Level (0-10 scale) post treatment: 0    ASSESSMENT/Changes in Function: Patient demonstrated good balance with Bosu activities and reported no increase in hip pain with therex. Patient will continue to benefit from skilled PT services to modify and progress therapeutic interventions, address functional mobility deficits, address ROM deficits, address strength deficits, analyze and address soft tissue restrictions and analyze and cue movement patterns to attain remaining goals. [x]  See Plan of Care  []  See progress note/recertification  []  See Discharge Summary         Progress towards goals / Updated goals:  1. Pt will be compliant and I with HEP to help reduce pain and improve abilities to complete ADLs.             EVAL: written hardcopy given and initiated exercises              CURRENT:progressing 12/2/19  2. Pt will report no higher than 4/10 right knee pain to improve patient's ability to complete prolong sitting at desk.  Eveleen Jointer: 7/10              CURRENT: progressing 12/6/19  3. 3. Pt will report no higher than 3/10 B hip pain to improve patient's ability to complete stairs to get to apartment.              EVAL: 5/10              CURRENT:     Long term goals to be completed in 4 weeks  1. Pt will demonstrate 71 FOTO score to improve abilities to functional activities.               EVAL: 69              CURRENT:  2. Pt will report no higher than 1/10  B hip pain to improve patient's ability to complete stairs to get to apartment.  Antonia Putt: 5/10              CURRENT:   3. Pt will report no higher than 2/10 right knee pain to improve patient's ability to complete prolong sitting at desk.  Antonia Putt: 7/10              CURRENT:  4. Pt will demonstrate 5/5 strength in B LE to improve patient's ability to functional activities.               EVAL: 4/5 B hip ext/abd, right knee ext              CURRENT:    PLAN  []  Upgrade activities as tolerated     [x]  Continue plan of care  []  Update interventions per flow sheet       []  Discharge due to:_  []  Other:_      Vergia Spurling, PTA 12/9/2019  7:43 AM    Future Appointments   Date Time Provider Jus Garcia   12/11/2019  7:30 AM Fadi Gutierrez PTA [facility]   12/17/2019  7:30 AM Fe Field PT [facility]   12/20/2019  7:30 AM Fadi Gutierrez PTA [facility]   12/24/2019  7:30 AM Fe Field PT [facility]   12/27/2019  8:00 AM Fe Field PT [facility]   12/31/2019  7:30 AM Fe Field PT [facility]

## 2019-12-17 ENCOUNTER — APPOINTMENT (OUTPATIENT)
Dept: PHYSICAL THERAPY | Age: 49
End: 2019-12-17
Payer: COMMERCIAL

## 2019-12-18 ENCOUNTER — HOSPITAL ENCOUNTER (OUTPATIENT)
Dept: PHYSICAL THERAPY | Age: 49
Discharge: HOME | End: 2019-12-18
Payer: COMMERCIAL

## 2019-12-18 PROCEDURE — 97112 NEUROMUSCULAR REEDUCATION: CPT

## 2019-12-18 PROCEDURE — 97164 PT RE-EVAL EST PLAN CARE: CPT

## 2019-12-18 PROCEDURE — 97530 THERAPEUTIC ACTIVITIES: CPT

## 2019-12-18 PROCEDURE — 97110 THERAPEUTIC EXERCISES: CPT

## 2019-12-18 NOTE — PROGRESS NOTES
PT DISCHARGE DAILY NOTE AND [id number]    Patient name: Beth Chopra Start of Care: 2019   Referral source: Daya Perez DO : 1970   Medical/Treatment Diagnosis: OA (osteoarthritis) of knee [M17.10]  Trochanteric bursitis, unspecified hip [M70.60] Onset Date:     Prior Hospitalization: see medical history Provider#: [id number]   Medications: Verified on Patient Summary List    Comorbidities: OA, hx of back pain, DDD, DM, ectopic pregnancy   Prior Level of Function:I with ADLs, driving, gym 4x/day (cardio/bike, free weights, static stretches), other 3 days cardio    Visits from Start of Care: 5    Missed Visits: 1    Reporting Period : 19 to 19    Date:2019  : 1970  [x]  Patient  Verified  Payor: Ramone Mansfield / Plan: Kaity Stephen HMO / Product Type: HMO /    In time:300  Out time:346  Total Treatment Time (min): 46  Visit #: 5 of 8    SUBJECTIVE  Pain Level (0-10 scale): 0  Any medication changes, allergies to medications, adverse drug reactions, diagnosis change, or new procedure performed?: [x] No    [] Yes (see summary sheet for update)  Subjective functional status/changes:   [] No changes reported  Pt reports 90% improvement due to decrease limp with ambulation and decreased pain/stiffness when standing up. HEP is being completed daily and going to the gym 3x/week. Pt is ready for discharge    OBJECTIVE    8 min []Eval                  [x]Re-Eval       11 min Therapeutic Exercise:  [x] See flow sheet :   Rationale: increase ROM, increase strength and patient education on HEP to improve the patients ability to complete functional task.     10 min Therapeutic Activity:  [x]  See flow sheet :   Rationale: FOTO, discharge instruction, education on proper sequence of stretching and exercises  to improve the patients ability to self care with decreased pain     10 min Neuromuscular Re-education:  [x]  See flow sheet :   Rationale: increase strength, improve coordination and increase proprioception  to improve the patients ability to complete functional task with proper muscle activation    7 min Gait Training:    TM see flow sheet   Rationale: to increase cardiovascular endurance and strength to complete functional task          With   [x] TE   [x] TA   [x] neuro   [x] gait: Patient Education: [x] Review HEP    [] Progressed/Changed HEP based on:   [] positioning   [] body mechanics   [] transfers   [] heat/ice application    [] other:      Other Objective/Functional Measures:     MMT: B CELE 5/5    Access Code: [email or web address]   URL: [email or web address]   Date: 12/18/2019   Prepared by: Cecily Prime     Exercises   Standing Alternating Knee Flexion with Ankle Weights - 10 reps - 3 sets - 1x daily - 7x weekly   Supine Hamstring Curl on Swiss Ball - 10 reps - 3 sets - 1x daily - 7x weekly   Standing ITB Stretch - 10 reps - 3 sets - 1x daily - 7x weekly   Clamshell with Resistance - 10 reps - 3 sets - 1x daily - 7x weekly   Sidelying Reverse Clamshell with Resistance - 10 reps - 3 sets - 1x daily - 7x weekly   Quadruped Hip Abduction and External Rotation - 10 reps - 3 sets - 1x daily - 7x weekly   Quadruped Hip Extension Kicks - 10 reps - 3 sets - 1x daily - 7x weekly   Bird Dog - 10 reps - 3 sets - 1x daily - 7x weekly   Hip Flexor Stretch with Chair - 10 reps - 3 sets - 1x daily - 7x weekly   Standing Hamstring Stretch on Chair - 10 reps - 3 sets - 1x daily - 7x weekly   Supine Bridge with Mini Swiss Ball Between Knees - 10 reps - 3 sets - 1x daily - 7x weekly   Single Leg Bridge - 10 reps - 3 sets - 1x daily - 7x weekly      Pain Level (0-10 scale) post treatment: 0    Summary of Care:  Goal: Pt will be compliant and I with HEP to help reduce pain and improve abilities to complete ADLs. Status at last note/certification: compliance  Status at discharge: met    Goal: Pt will demonstrate 71 FOTO score to improve abilities to functional activities.   Status at last note/certification: 96  Status at discharge: met    Goal: Pt will report no higher than 1/10  B hip pain to improve patient's ability to complete stairs to get to apartment. Status at last note/certification: 4-9.3  Status at discharge: met    Goal: Pt will report no higher than 2/10 right knee pain to improve patient's ability to complete prolong sitting at desk. Status at last note/certification: 5/00  Status at discharge: met    Goal: Pt will demonstrate 5/5 strength in B LE to improve patient's ability to functional activities. Status at last note/certification: 5/5  Status at discharge: met    ASSESSMENT/Changes in Function: Pt has completed 5 of skilled PT interventions and 1 cancellations to address B hip and R knee pain. Pt reports 90% improvement due to PT helping to decrease limp with ambulation and decreased pain/stiffness when standing up. Pt would like to be discharged and complete HEP I. After re-evaluation, shows [facility] strength and has met all PT goals. Pt will be discharged from physical therapy today with recommendations to continue completing HEP daily independently and follow up with physician as needed.     Thank you for this referral!      PLAN  [x]Discontinue therapy: [x]Patient has reached or is progressing toward set goals      []Patient is non-compliant or has abdicated      []Due to lack of appreciable progress towards set goals    Kayla Servin 12/18/2019  3:20 PM

## 2019-12-18 NOTE — PROGRESS NOTES
Physical Therapy Discharge Instructions      [facility] [address], [address] [place] [address], [address] [place], [place] [address]y [address]  [phone] fax    Patient: Brunilda Avelar  : 1970    Continue Home Exercise Program 7 times per day for 2-3 weeks, then decrease to 3 times per week    Continue with    [] Ice  as needed  (no more than 20 min)     [x] Heat         Follow up with MD:     [] Upon completion of therapy     [x] As needed    Recommendations:     [x]   Return to activity with home program    []   Return to activity with the following modifications:       []Post Rehab Program    []Join Independent aquatic program     []Return to/join local gym    Additional Comments: Thank you for choosing us for your physical therapy services. It has been a pleasure working with you. Best wishes!     Start dynamic warm-up, then workout followed static stretches      Carmen Azul 2019 3:38 PM

## 2019-12-20 ENCOUNTER — APPOINTMENT (OUTPATIENT)
Dept: PHYSICAL THERAPY | Age: 49
End: 2019-12-20
Payer: COMMERCIAL

## 2019-12-24 ENCOUNTER — APPOINTMENT (OUTPATIENT)
Dept: PHYSICAL THERAPY | Age: 49
End: 2019-12-24
Payer: COMMERCIAL

## 2019-12-27 ENCOUNTER — APPOINTMENT (OUTPATIENT)
Dept: PHYSICAL THERAPY | Age: 49
End: 2019-12-27
Payer: COMMERCIAL

## 2019-12-31 ENCOUNTER — APPOINTMENT (OUTPATIENT)
Dept: PHYSICAL THERAPY | Age: 49
End: 2019-12-31
Payer: COMMERCIAL

## 2020-10-28 ENCOUNTER — OFFICE VISIT (OUTPATIENT)
Dept: ORTHOPEDIC SURGERY | Age: 50
End: 2020-10-28
Payer: COMMERCIAL

## 2020-10-28 VITALS
HEART RATE: 76 BPM | OXYGEN SATURATION: 99 % | DIASTOLIC BLOOD PRESSURE: 70 MMHG | BODY MASS INDEX: 37.9 KG/M2 | WEIGHT: 222 LBS | HEIGHT: 64 IN | SYSTOLIC BLOOD PRESSURE: 125 MMHG | TEMPERATURE: 97.5 F

## 2020-10-28 DIAGNOSIS — E66.01 SEVERE OBESITY (HCC): ICD-10-CM

## 2020-10-28 DIAGNOSIS — M75.51 SUBACROMIAL BURSITIS OF RIGHT SHOULDER JOINT: Primary | ICD-10-CM

## 2020-10-28 DIAGNOSIS — M25.511 RIGHT SHOULDER PAIN, UNSPECIFIED CHRONICITY: ICD-10-CM

## 2020-10-28 PROCEDURE — 20611 DRAIN/INJ JOINT/BURSA W/US: CPT | Performed by: ORTHOPAEDIC SURGERY

## 2020-10-28 PROCEDURE — 99203 OFFICE O/P NEW LOW 30 MIN: CPT | Performed by: ORTHOPAEDIC SURGERY

## 2020-10-28 PROCEDURE — 73030 X-RAY EXAM OF SHOULDER: CPT | Performed by: ORTHOPAEDIC SURGERY

## 2020-10-28 RX ORDER — CELECOXIB 100 MG/1
CAPSULE ORAL
COMMUNITY
Start: 2020-09-09

## 2020-10-28 RX ORDER — HYDROCHLOROTHIAZIDE 25 MG/1
TABLET ORAL
COMMUNITY
Start: 2020-09-10

## 2020-10-28 RX ORDER — TRAZODONE HYDROCHLORIDE 50 MG/1
TABLET ORAL
COMMUNITY
Start: 2020-09-08

## 2020-10-28 RX ORDER — TRIAMCINOLONE ACETONIDE 40 MG/ML
40 INJECTION, SUSPENSION INTRA-ARTICULAR; INTRAMUSCULAR ONCE
Qty: 1 ML | Refills: 0
Start: 2020-10-28 | End: 2020-10-28

## 2020-10-28 NOTE — PROGRESS NOTES
Saira Alarcon  1970   Chief Complaint   Patient presents with    Shoulder Pain     right shoulder        HISTORY OF PRESENT ILLNESS  Saira George is a 52 y.o. female who presents today for evaluation of right shoulder pain. She rates her pain 0/10 today. Pain has been present since August. Pt states she had liposuction and she states she had to lift herself up with her arm. Pain with raising the arm, reaching overhead. Has tried taking Aleve. Patient describes the pain as sharp that is Intermittent in nature. Symptoms are worse with lifting the shoulder, Activity and is better with  nothing. Associated symptoms include nothing. Since problem started, it: is unchanged. Pain does not wake patient up at night. Has taken Aleve for the problem. Has tried following treatments: Injections:NO; Brace:NO; Therapy:NO; Cane/Crutch:NO       Allergies   Allergen Reactions    Diclofenac Hives    Gabapentin Hives     Even low dose of 100 mg.  Causes side effects    Lyrica [Pregabalin] Other (comments)     Weight gain        Past Medical History:   Diagnosis Date    Diabetes ([facility] [address]     Polyarthralgia 9/15    + IRWIN, trial plaquenil Dr. Maria Fernanda Tanner Rheumatoid arthritis [facility])       Social History     Socioeconomic History    Marital status:      Spouse name: Not on file    Number of children: Not on file    Years of education: Not on file    Highest education level: Not on file   Occupational History    Occupation: process cases   Social Needs    Financial resource strain: Not on file    Food insecurity     Worry: Not on file     Inability: Not on file   [employer] needs     Medical: Not on file     Non-medical: Not on file   Tobacco Use    Smoking status: Never Smoker    Smokeless tobacco: Never Used   Substance and Sexual Activity    Alcohol use: No    Drug use: No    Sexual activity: Not on file   Lifestyle    Physical activity     Days per week: Not on file     Minutes per session: Not on file    Stress: Not on file   Relationships    Social connections     Talks on phone: Not on file     Gets together: Not on file     Attends Baptism service: Not on file     Active member of club or organization: Not on file     Attends meetings of clubs or organizations: Not on file     Relationship status: Not on file    Intimate partner violence     Fear of current or ex partner: Not on file     Emotionally abused: Not on file     Physically abused: Not on file     Forced sexual activity: Not on file   Other Topics Concern    Not on file   Social History Narrative    Not on file      Past Surgical History:   Procedure Laterality Date    HX GYN      ectopic pregnancy    HX TUBAL LIGATION        Family History   Problem Relation Age of Onset    HIV/AIDS Mother     Heart Disease Father       Current Outpatient Medications   Medication Sig    celecoxib (CELEBREX) 100 mg capsule     hydroCHLOROthiazide (HYDRODIURIL) 25 mg tablet TAKE 1 T PO D    traZODone (DESYREL) 50 mg tablet TK 1 T PO NIGHTLY PRF SLEEP    triamcinolone acetonide (Kenalog) 40 mg/mL injection 1 mL by IntraMUSCular route once for 1 dose.  ERGOCALCIFEROL, VITAMIN D2, (VITAMIN D2 PO) Take  by mouth daily.  ondansetron (ZOFRAN ODT) 4 mg disintegrating tablet Take 1 Tab by mouth every eight (8) hours as needed for Nausea.  oxyCODONE-acetaminophen (PERCOCET) 5-325 mg per tablet Take 1 Tab by mouth every six (6) hours as needed for Pain. Max Daily Amount: 4 Tabs.  topiramate (TOPAMAX) 100 mg tablet Take 1 Tab by mouth nightly.  acetaminophen-codeine (TYLENOL #3) 300-30 mg per tablet Take 1 Tab by mouth two (2) times daily as needed for Pain. Max Daily Amount: 2 Tabs.  ibuprofen (MOTRIN) 800 mg tablet 800 mg.    atorvastatin (LIPITOR) 10 mg tablet TK 1 T PO D    aspirin delayed-release 81 mg tablet Take 81 mg by mouth daily.  folic acid (FOLVITE) 1 mg tablet Take 1 mg by mouth daily.     hydroxychloroquine (PLAQUENIL) 200 mg tablet Take 200 mg by mouth daily.  methotrexate (RHEUMATREX) 2.5 mg tablet Take 2.5 mg by mouth every Monday. Take 7 pills  Indications: RHEUMATOID ARTHRITIS     No current facility-administered medications for this visit. REVIEW OF SYSTEM   Patient denies: Weight loss, Fever/Chills, HA, Visual changes, Fatigue, Chest pain, SOB, Abdominal pain, N/V/D/C, Blood in stool or urine, Edema. Pertinent positive as above in HPI. All others were negative    PHYSICAL EXAM:   Visit Vitals  /70 (BP 1 Location: Right arm, BP Patient Position: Sitting)   Pulse 76   Temp 97.5 °F (36.4 °C) (Temporal)   Ht 5' 4\" (1.626 m)   Wt 222 lb (100.7 kg)   SpO2 99%   BMI 38.11 kg/m²     The patient is a well-developed, well-nourished female   in no acute distress. The patient is alert and oriented times three. The patient is alert and oriented times three. Mood and affect are normal.  LYMPHATIC: lymph nodes are not enlarged and are within normal limits  SKIN: normal in color and non tender to palpation. There are no bruises or abrasions noted. NEUROLOGICAL: Motor sensory exam is within normal limits. Reflexes are equal bilaterally. There is normal sensation to pinprick and light touch  MUSCULOSKELETAL:  Examination Right shoulder   Skin Intact   AC joint tenderness -   Biceps tenderness -   Forward flexion/Elevation    Active abduction    Glenohumeral abduction 90   External rotation ROM 45   Internal rotation ROM 30   Apprehension -   Isabellas Relocation -   Jerk -   Load and Shift -   Obriens -   Speeds -   Impingement sign +   Supraspinatus/Empty Can -, 5/5   External Rotation Strength -, 5/5   Lift Off/Belly Press -, 5/5   Neurovascular Intact       PROCEDURE: Right Shoulder Injection with Ultrasound Guidance  Indication:Right Shoulder pain/swelling    After sterile prep, 6 cc of Xylocaine and 1 cc of Kenalog were injected into the right shoulder.   Ultrasound images captured using [address] [place] [address] Loop Ultrasound machine and scanned into patient's chart. [facility]  OFFICE PROCEDURE PROGRESS NOTE        Chart reviewed for the following:  Triny Martinez M.D, have reviewed the History, Physical and updated the Allergic reactions for Francisco Gonzaleze performed immediately prior to start of procedure:  Triny Martinez M.D, have performed the following reviews on [address] [place] [address] prior to the start of the procedure:            * Patient was identified by name and date of birth   * Agreement on procedure being performed was verified  * Risks and Benefits explained to the patient  * Procedure site verified and marked as necessary  * Patient was positioned for comfort  * Consent was signed and verified     Time: 4:11 PM     Date of procedure: 10/28/2020    Procedure performed by:  Vero Sarah M.D    Provider assisted by: (see medication administration)    How tolerated by patient: tolerated the procedure well with no complications    Comments: none        IMAGING: XR of right shoulder obtained in the office dated 10/28/2020 was reviewed and read by Dr. Meghna Chavez: No acute abnormalities         IMPRESSION:      ICD-10-CM ICD-9-CM    1. Subacromial bursitis of right shoulder joint  M75.51 726.19 TRIAMCINOLONE ACETONIDE INJ      triamcinolone acetonide (Kenalog) 40 mg/mL injection      US GUIDE INJ/ASP/ARTHRO LG JNT/BURSA   2. Right shoulder pain, unspecified chronicity  M25.511 719.41 AMB POC XRAY, SHOULDER; COMPLETE, 2+   3. Severe obesity ([address]  E66.01 278.01         PLAN:  1. Pt presents today with right shoulder pain due to subacromial bursitis and I would like to try an injection today. Risk factors include: dm, BMI>35  2. No ultrasound exam indicated today  3. Yes cortisone injection indicated today R SHOULDER US  4. No Physical/Occupational Therapy indicated today  5.  No diagnostic test indicated today: 6. No durable medical equipment indicated today  7. No referral indicated today   8. No medications indicated today:   9. No Narcotic indicated today      RTC 3 weeks if pain continues      Scribed by Sumeet Smith [address]) as dictated by Franc Claros MD    I, Dr. Franc Claros, confirm that all documentation is accurate.     Franc Claros M.D.   [facility] and Sp[facility] Specialist

## 2021-10-13 ENCOUNTER — OFFICE VISIT (OUTPATIENT)
Dept: ORTHOPEDIC SURGERY | Age: 51
End: 2021-10-13
Payer: COMMERCIAL

## 2021-10-13 VITALS
HEIGHT: 64 IN | OXYGEN SATURATION: 99 % | HEART RATE: 63 BPM | TEMPERATURE: 97.2 F | BODY MASS INDEX: 38.41 KG/M2 | WEIGHT: 225 LBS

## 2021-10-13 DIAGNOSIS — M25.562 LEFT KNEE PAIN, UNSPECIFIED CHRONICITY: Primary | ICD-10-CM

## 2021-10-13 DIAGNOSIS — M22.2X2 PATELLOFEMORAL PAIN SYNDROME OF LEFT KNEE: ICD-10-CM

## 2021-10-13 PROCEDURE — 73564 X-RAY EXAM KNEE 4 OR MORE: CPT | Performed by: ORTHOPAEDIC SURGERY

## 2021-10-13 PROCEDURE — 99214 OFFICE O/P EST MOD 30 MIN: CPT | Performed by: ORTHOPAEDIC SURGERY

## 2021-10-13 RX ORDER — DICLOFENAC SODIUM 50 MG/1
50 TABLET, DELAYED RELEASE ORAL 2 TIMES DAILY WITH MEALS
Qty: 60 TABLET | Refills: 3 | Status: SHIPPED | OUTPATIENT
Start: 2021-10-13

## 2021-10-13 NOTE — PROGRESS NOTES
Saira Alarcon  1970   Chief Complaint   Patient presents with    Knee Pain     left        HISTORY OF PRESENT ILLNESS  Tomas Mendez is a 54 y.o. female who presents today for evaluation of left knee pain. She rates the pain a 2/10 today. The pain is achy/soreness located on the anterior/medial aspect of her knee. She noticed her pain 2 weeks ago when she was repositioning herself while doing leg extensions with weight. She has take tylenol but not specifically for her knee. She just feels the soreness in her knee when doing certain things. When she sits on the couch with her leg folded under her she feels the soreness as well. She has not had PT or taken anything else to help the pain. Allergies   Allergen Reactions    Diclofenac Hives    Gabapentin Hives     Even low dose of 100 mg. Causes side effects    Lyrica [Pregabalin] Other (comments)     Weight gain        Past Medical History:   Diagnosis Date    Diabetes ([facility] [address]     Polyarthralgia 9/15    + [facility], trial plaquenil Dr. Nan Magdaleno Rheumatoid arthritis [facility])       Social History     Socioeconomic History    Marital status:      Spouse name: Not on file    Number of children: Not on file    Years of education: Not on file    Highest education level: Not on file   Occupational History    Occupation: process cases   Tobacco Use    Smoking status: Never Smoker    Smokeless tobacco: Never Used   Substance and Sexual Activity    Alcohol use: No    Drug use: No    Sexual activity: Not on file   Other Topics Concern    Not on file   Social History Narrative    Not on file     Social Determinants of Health     Financial Resource Strain:     Difficulty of Paying Living Expenses:    Food Insecurity:     Worried About [address] in the Last Year:     [address] in the Last Year:    Transportation Needs:     Lack of Transportation (Medical):      Lack of Transportation (Non-Medical):    Physical Activity:     Days of Exercise per Week:     Minutes of Exercise per Session:    Stress:     Feeling of Stress :    Social Connections:     Frequency of Communication with Friends and Family:     Frequency of Social Gatherings with Friends and Family:     Attends Zoroastrian Services:     Active Member of Clubs or Organizations:     Attends Club or Organization Meetings:     Marital Status:    Intimate Partner Violence:     Fear of Current or Ex-Partner:     Emotionally Abused:     Physically Abused:     Sexually Abused:       Past Surgical History:   Procedure Laterality Date    HX GYN      ectopic pregnancy    HX TUBAL LIGATION        Family History   Problem Relation Age of Onset    HIV/AIDS Mother     Heart Disease Father       Current Outpatient Medications   Medication Sig    diclofenac EC (VOLTAREN) 50 mg EC tablet Take 1 Tablet by mouth two (2) times daily (with meals).  celecoxib (CELEBREX) 100 mg capsule     hydroCHLOROthiazide (HYDRODIURIL) 25 mg tablet TAKE 1 T PO D    folic acid (FOLVITE) 1 mg tablet Take 1 mg by mouth daily.  ERGOCALCIFEROL, VITAMIN D2, (VITAMIN D2 PO) Take  by mouth daily.  traZODone (DESYREL) 50 mg tablet TK 1 T PO NIGHTLY PRF SLEEP (Patient not taking: Reported on 10/13/2021)    ondansetron (ZOFRAN ODT) 4 mg disintegrating tablet Take 1 Tab by mouth every eight (8) hours as needed for Nausea. (Patient not taking: Reported on 10/13/2021)    oxyCODONE-acetaminophen (PERCOCET) 5-325 mg per tablet Take 1 Tab by mouth every six (6) hours as needed for Pain. Max Daily Amount: 4 Tabs. (Patient not taking: Reported on 10/13/2021)    topiramate (TOPAMAX) 100 mg tablet Take 1 Tab by mouth nightly. (Patient not taking: Reported on 10/13/2021)    acetaminophen-codeine (TYLENOL #3) 300-30 mg per tablet Take 1 Tab by mouth two (2) times daily as needed for Pain. Max Daily Amount: 2 Tabs. (Patient not taking: Reported on 10/13/2021)    ibuprofen (MOTRIN) 800 mg tablet 800 mg. (Patient not taking: Reported on 10/13/2021)    atorvastatin (LIPITOR) 10 mg tablet TK 1 T PO D (Patient not taking: Reported on 10/13/2021)    aspirin delayed-release 81 mg tablet Take 81 mg by mouth daily. (Patient not taking: Reported on 10/13/2021)    hydroxychloroquine (PLAQUENIL) 200 mg tablet Take 200 mg by mouth daily. (Patient not taking: Reported on 10/13/2021)    methotrexate (RHEUMATREX) 2.5 mg tablet Take 2.5 mg by mouth every Monday. Take 7 pills  Indications: RHEUMATOID ARTHRITIS (Patient not taking: Reported on 10/13/2021)     No current facility-administered medications for this visit. REVIEW OF SYSTEM   Patient denies: Weight loss, Fever/Chills, HA, Visual changes, Fatigue, Chest pain, SOB, Abdominal pain, N/V/D/C, Blood in stool or urine, Edema. Pertinent positive as above in HPI. All others were negative    PHYSICAL EXAM:   Visit Vitals  Pulse 63   Temp 97.2 °F (36.2 °C)   Ht 5' 4\" (1.626 m)   Wt 225 lb (102.1 kg)   SpO2 99%   BMI 38.62 kg/m²     The patient is a well-developed, well-nourished female   in no acute distress. The patient is alert and oriented times three. The patient is alert and oriented times three. Mood and affect are normal.  LYMPHATIC: lymph nodes are not enlarged and are within normal limits  SKIN: normal in color and non tender to palpation. There are no bruises or abrasions noted. NEUROLOGICAL: Motor sensory exam is within normal limits. Reflexes are equal bilaterally.  There is normal sensation to pinprick and light touch  MUSCULOSKELETAL:  Examination Left knee   Skin Intact   Range of motion 0-130   Effusion -   Medial joint line tenderness -   Lateral joint line tenderness -   Tenderness Pes Bursa -   Tenderness insertion MCL -   Tenderness insertion LCL -   Chets -   Patella crepitus -   Patella grind +   Lachman -   Pivot shift -   Anterior drawer -   Posterior drawer -   Varus stress -   Valgus stress -   Neurovascular Intact   Calf Swelling and Tenderness to Palpation -   Joel's Test -   Hamstring Cord Tightness -        IMAGIN views of the left knee 10/13/21 do not show any acute bony abnormalities. IMPRESSION:      ICD-10-CM ICD-9-CM    1. Left knee pain, unspecified chronicity  M25.562 719.46 AMB POC XRAY, KNEE; COMPLETE, 4+ VIEW   2. Patellofemoral pain syndrome of left knee  M22.2X2 719.46         PLAN:   Pt having left knee pain  I think her pain is coming from patellofemoral syndrome. I have recommended conservative treatment for this - knee conditioning exercises, prescribe diclofenac, she will also take a couple weeks off lower leg strengthening. Talked about PT and if her symptoms do not improve will likely order this at next visit. I do not think she needs advanced imaging at this time. This should get better with time.   [facility]

## 2021-10-18 ENCOUNTER — TELEPHONE (OUTPATIENT)
Dept: ORTHOPEDIC SURGERY | Age: 51
End: 2021-10-18

## 2021-10-18 RX ORDER — MELOXICAM 15 MG/1
15 TABLET ORAL DAILY
Qty: 30 TABLET | Refills: 4 | Status: SHIPPED | OUTPATIENT
Start: 2021-10-18

## 2021-10-18 NOTE — TELEPHONE ENCOUNTER
We can't do anything stronger then a prescription NSAID. She can try OTC tylenol and ice to help with pain. I can prescribe a different NSAID which is mobic if she would like. That is not listed as an allergy but if she has an allergy to one NSAID she will likely have the same with others.

## 2021-10-18 NOTE — TELEPHONE ENCOUNTER
Spoke with patient and patient wants a prescription of Mobic send to her pharmacy. Patient verberlized understanding PA [employer].

## 2021-10-18 NOTE — TELEPHONE ENCOUNTER
Patient left a message expressing her dissatisfaction with the fact that she was prescribed Diclofenac when it is listed in her chart as something she is allergic to. She says this was discussed during her appointment so she doesn't know why we would prescribe this for. She now has a medication that she cannot even take.  Please advise

## 2021-12-15 ENCOUNTER — APPOINTMENT (OUTPATIENT)
Dept: PHYSICAL THERAPY | Age: 51
End: 2021-12-15

## 2021-12-23 ENCOUNTER — HOSPITAL ENCOUNTER (OUTPATIENT)
Dept: PHYSICAL THERAPY | Age: 51
Discharge: HOME | End: 2021-12-23
Payer: COMMERCIAL

## 2021-12-23 PROCEDURE — 97162 PT EVAL MOD COMPLEX 30 MIN: CPT

## 2021-12-23 PROCEDURE — 97110 THERAPEUTIC EXERCISES: CPT

## 2021-12-23 PROCEDURE — 97112 NEUROMUSCULAR REEDUCATION: CPT

## 2021-12-23 NOTE — PROGRESS NOTES
PT DAILY TREATMENT NOTE 10-18    Patient Name: Airam Wooten  Date:2021  : 1970  [x]  Patient  Verified  Payor: Mariano Arevalo / Plan: [place] [employer] [employer] / Product Type: HMO /    In time:1205  Out time:1240  Total Treatment Time (min): 35  Visit #: 1 of 4      Treatment Area: Left knee pain [M25.562]    SUBJECTIVE  Pain Level (0-10 scale): 0  Any medication changes, allergies to medications, adverse drug reactions, diagnosis change, or new procedure performed?: [x] No    [] Yes (see summary sheet for update)  Subjective functional status/changes:   [] No changes reported  See eval    OBJECTIVE      12 min [x]Eval                  []Re-Eval       15 min Therapeutic Exercise:  [] See flow sheet :   Rationale: increase strength to improve the patients ability to perform daily activities      8 min Neuromuscular Re-education:  []  See flow sheet :KT: patellar lift 75%   Rationale: increase strength  to improve the patients ability to ambulate without pain    With   [] TE   [] TA   [] neuro   [] other: Patient Education: [x] Review HEP    [] Progressed/Changed HEP based on:   [] positioning   [] body mechanics   [] transfers   [] heat/ice application    [] other:      Other Objective/Functional Measures:      Pain Level (0-10 scale) post treatment: 0    ASSESSMENT/Changes in Function: see POC    Patient will continue to benefit from skilled PT services to modify and progress therapeutic interventions, address strength deficits, analyze and address soft tissue restrictions, analyze and cue movement patterns and address imbalance/dizziness to attain remaining goals. [x]  See Plan of Care  []  See progress note/recertification  []  See Discharge Summary         Progress towards goals / Updated goals:  Short Term Goals: To be accomplished in 1 weeks:  1. I and compliant with HEP for self management of symptoms. IE: issued HEP  Long Term Goals: To be accomplished in 4 weeks:  1.  Improve FOTO to 72 to indicate improved function with daily activities. IE:67  2. Increase left glute strength to 4+5/5 to increase tolerance for prolonged walking. IE: 3+/5  3. Report >=50% improvement to enable patient to resume normal workouts at gym.    IE:0%  PLAN  []  Upgrade activities as tolerated     [x]  Continue plan of care  []  Update interventions per flow sheet       []  Discharge due to:_  []  Other:_      ELIZABETH Arredondo, CMTPT 12/23/2021  1:41 PM    Future Appointments   Date Time Provider Jus Garcia   1/4/2022 12:45 PM Senthil Durbin, PT [facility]   1/11/2022 12:45 PM Senthil Durbin, PT [facility]   1/18/2022 12:45 PM Senthil Durbin, PT [facility]   1/25/2022 12:45 PM Senthil Durbin, PT [facility]

## 2021-12-23 NOTE — PROGRESS NOTES
[facility]  [address] [facility] [address]  [place], [address]  [phone] fax    Plan of Care/ Statement of Necessity for Physical Therapy Services    Patient name: Irene Gonzalez Start of Care: 2021   Referral source: Shefali Daily, * : 1970    Medical Diagnosis: Left knee pain [M25.562]  Payor: Meryl Andrade / Plan: [employer] [place] HMO / Product Type: HMO /  Onset Date:3-4 months ago    Treatment Diagnosis: Left knee pain   Prior Hospitalization: see medical history Provider#: [id number]   Medications: Verified on Patient summary List    Comorbidities: none reported   Prior Level of Function: Sedentary work for the [employer]; works out consistently      [employer] of Care and following information is based on the information from the initial evaluation. Assessment/ key information: 54y.o. year old female presents with CC of left medial knee pain that is consistent with patellofemoral syndrome. Impairments noted today: increased mm restrictions and pain along left medial plica and VMO,and ITB; pain with TKE; decreased glute max and med strength; decreased stability. Patient will benefit from physical therapy to address deficits, and ultimately to return patient to prior level of function. Evaluation Complexity History LOW Complexity : Zero comorbidities / personal factors that will impact the outcome / POC; Examination MEDIUM Complexity : 3 Standardized tests and measures addressing body structure, function, activity limitation and / or participation in recreation  ;Presentation MEDIUM Complexity : Evolving with changing characteristics  ; Clinical Decision Making MEDIUM Complexity : FOTO score of 26-74  Overall Complexity Rating: MEDIUM  Problem List: pain affecting function, decrease strength, impaired gait/ balance, decrease ADL/ functional abilitiies and decrease activity tolerance   Treatment Plan may include any combination of the following: Therapeutic exercise, Neuromuscular re-education, Physical agent/modality, Gait/balance training, Manual therapy and Patient education  Patient / Family readiness to learn indicated by: asking questions, trying to perform skills and interest  Persons(s) to be included in education: patient (P)  Barriers to Learning/Limitations: None  Patient Goal (s): to decrease pain  Patient Self Reported Health Status: excellent  Rehabilitation Potential: good    Short Term Goals: To be accomplished in 1 weeks:  1. I and compliant with HEP for self management of symptoms. Long Term Goals: To be accomplished in 4 weeks:  1. Improve FOTO to 72 to indicate improved function with daily activities. 2. Increase left glute strength to 4+5/5 to increase tolerance for prolonged walking. 3. Report >=50% improvement to enable patient to resume normal workouts at gym. Frequency / Duration: Patient to be seen 1 times per week for 4 weeks. Patient/ Caregiver education and instruction: Diagnosis, prognosis, self care and exercises   [x]  Plan of care has been reviewed with SHO Barone, PT 12/23/2021 1:22 PM    ________________________________________________________________________    I certify that the above Therapy Services are being furnished while the patient is under my care. I agree with the treatment plan and certify that this therapy is necessary.     [de-identified] Signature:____________Date:_________TIME:________     Krystina Cuenca *  ** Signature, Date and Time must be completed for valid certification **    Please sign and return to [facility] [address] [place] [address] & [facility]  [address]  [place], [address]  [phone] fax

## 2022-01-04 ENCOUNTER — APPOINTMENT (OUTPATIENT)
Dept: PHYSICAL THERAPY | Age: 52
End: 2022-01-04
Payer: COMMERCIAL

## 2022-01-07 ENCOUNTER — HOSPITAL ENCOUNTER (OUTPATIENT)
Dept: PHYSICAL THERAPY | Age: 52
Discharge: HOME | End: 2022-01-07
Payer: COMMERCIAL

## 2022-01-07 PROCEDURE — 97140 MANUAL THERAPY 1/> REGIONS: CPT

## 2022-01-07 PROCEDURE — 97110 THERAPEUTIC EXERCISES: CPT

## 2022-01-07 PROCEDURE — 97112 NEUROMUSCULAR REEDUCATION: CPT

## 2022-01-07 NOTE — PROGRESS NOTES
PT DAILY TREATMENT NOTE 10-18    Patient Name: Nickie Hi  Date:2022  : 1970  [x]  Patient  Verified  Payor: Ana Lilia Kapadia / Plan: [place] [employer] / Product Type: HMO /    In time:1110  Out time:1146  Total Treatment Time (min): 36  Visit #: 2 of 4      Treatment Area: Left knee pain [M25.562]    SUBJECTIVE  Pain Level (0-10 scale): 0  Any medication changes, allergies to medications, adverse drug reactions, diagnosis change, or new procedure performed?: [x] No    [] Yes (see summary sheet for update)  Subjective functional status/changes:   [] No changes reported  The exercises have been helping. I haven't done them in 2 days and I can feel it. OBJECTIVE        18 min Therapeutic Exercise:  [] See flow sheet :   Rationale: increase ROM and increase strength to improve the patients ability to perform daily activities      10 min Neuromuscular Re-education:  []  See flow sheet :   Rationale: increase ROM and increase strength  to improve the patients ability to perform daily activitie s    8 min Manual Therapy:  Stick left VL/ ITB   The manual therapy interventions were performed at a separate and distinct time from the therapeutic activities interventions. Rationale: decrease pain, increase ROM and increase tissue extensibility to reduce patellar restrictions  With   [] TE   [] TA   [] neuro   [] other: Patient Education: [x] Review HEP    [] Progressed/Changed HEP based on:   [] positioning   [] body mechanics   [] transfers   [] heat/ice application    [] other:      Other Objective/Functional Measures:      Pain Level (0-10 scale) post treatment: 0    ASSESSMENT/Changes in Function: TC with some exercises as patient had to leave early for a 12:00 meeting. Fatigues quickly with hip strengthening. +Crepitus noted with patellar mobs.      Patient will continue to benefit from skilled PT services to modify and progress therapeutic interventions, address functional mobility deficits, address strength deficits, analyze and address soft tissue restrictions and analyze and cue movement patterns to attain remaining goals. []  See Plan of Care  []  See progress note/recertification  []  See Discharge Summary         Progress towards goals / Updated goals:  Short Term Goals: To be accomplished in 1 weeks:  1. I and compliant with HEP for self management of symptoms.   IE: issued HEP  Current: goal met 1/7/22  Long Term Goals: To be accomplished in 4 weeks:  1. Improve FOTO to 72 to indicate improved function with daily activities. IE:67  2. Increase left glute strength to 4+5/5 to increase tolerance for prolonged walking. IE: 3+/5  3.  Report >=50% improvement to enable patient to resume normal workouts at gym.   IE:0%    PLAN  [x]  Upgrade activities as tolerated     []  Continue plan of care  []  Update interventions per flow sheet       []  Discharge due to:_  []  Other:_      Leodis Olszewski, ELIZABETH, CMTPT 1/7/2022  9:40 AM    Future Appointments   Date Time Provider Jus Garcia   1/7/2022 11:15 AM Gigi Tripathi PT [facility]   1/11/2022 12:45 PM Gigi Tripathi PT [facility]   1/18/2022 12:45 PM Gigi Tripathi PT [facility]   1/25/2022 12:45 PM Gigi Tripathi PT [facility]

## 2022-01-11 ENCOUNTER — HOSPITAL ENCOUNTER (OUTPATIENT)
Dept: PHYSICAL THERAPY | Age: 52
Discharge: HOME | End: 2022-01-11
Payer: COMMERCIAL

## 2022-01-11 PROCEDURE — 97140 MANUAL THERAPY 1/> REGIONS: CPT

## 2022-01-11 PROCEDURE — 97112 NEUROMUSCULAR REEDUCATION: CPT

## 2022-01-11 PROCEDURE — 97110 THERAPEUTIC EXERCISES: CPT

## 2022-01-11 NOTE — PROGRESS NOTES
PT DAILY TREATMENT NOTE 10-18    Patient Name: Rizwan Hay  Date:2022  : 1970  [x]  Patient  Verified  Payor: Gurpreet Pinch / Plan: [employer] HMO / Product Type: HMO /    In time:1250  Out time:130  Total Treatment Time (min): 40  Visit #: 3 of 4      Treatment Area: Left knee pain [M25.562]    SUBJECTIVE  Pain Level (0-10 scale): 0  Any medication changes, allergies to medications, adverse drug reactions, diagnosis change, or new procedure performed?: [x] No    [] Yes (see summary sheet for update)  Subjective functional status/changes:   [x] No changes reported      OBJECTIVE    24 min Therapeutic Exercise:  [] See flow sheet :   Rationale: increase ROM and increase strength to improve the patients ability to perform ADLs     8 min Neuromuscular Re-education:  []  See flow sheet :   Rationale: increase strength, improve coordination, improve balance and increase proprioception  to improve the patients stability from daily activities     8 min Manual Therapy:  STM left VL; grade 4 medial glides   The manual therapy interventions were performed at a separate and distinct time from the therapeutic activities interventions. Rationale: decrease pain, increase ROM and increase tissue extensibility to perform daily activities   With   [] TE   [] TA   [] neuro   [] other: Patient Education: [x] Review HEP    [] Progressed/Changed HEP based on:   [] positioning   [] body mechanics   [] transfers   [] heat/ice application    [] other:      Other Objective/Functional Measures:      Pain Level (0-10 scale) post treatment: 0    ASSESSMENT/Changes in Function: Fatigues quickly with hip strengthening; slight valgus collapse with eccentric step downs. Patient will continue to benefit from skilled PT services to modify and progress therapeutic interventions, address strength deficits, analyze and address soft tissue restrictions and analyze and cue movement patterns to attain remaining goals.      []  See Plan of Care  []  See progress note/recertification  []  See Discharge Summary         Progress towards goals / Updated goals:  Short Term Goals: To be accomplished in 1 weeks:  1. I and compliant with HEP for self management of symptoms.   IE: issued HEP  Current: goal met 1/7/22  Long Term Goals: To be accomplished in 4 weeks:  1. Improve FOTO to 72 to indicate improved function with daily activities.   IE:67  2. Increase left glute strength to 4+5/5 to increase tolerance for prolonged walking. IE: 3+/5  Current: 4/5 1/11/22  3.  Report >=50% improvement to enable patient to resume normal workouts at gym.   IE:0%    PLAN  [x]  Upgrade activities as tolerated     []  Continue plan of care  []  Update interventions per flow sheet       []  Discharge due to:_  []  Other:_      Radha Horne, ELIZABETH, CMTPT 1/11/2022  8:48 AM    Future Appointments   Date Time Provider Jus Garcia   1/11[facility]2022 12:45 PM [facility]   1/18/2022 12:45 PM Poli Diaz, PT [facility]   1/25/2022 12:45 PM Poli Diaz, PT [facility]

## 2022-01-18 ENCOUNTER — HOSPITAL ENCOUNTER (OUTPATIENT)
Dept: PHYSICAL THERAPY | Age: 52
Discharge: HOME | End: 2022-01-18
Payer: COMMERCIAL

## 2022-01-18 PROCEDURE — 97140 MANUAL THERAPY 1/> REGIONS: CPT

## 2022-01-18 PROCEDURE — 97014 ELECTRIC STIMULATION THERAPY: CPT

## 2022-01-18 PROCEDURE — 97535 SELF CARE MNGMENT TRAINING: CPT

## 2022-01-18 NOTE — PROGRESS NOTES
PT DAILY TREATMENT NOTE 10-18    Patient Name: Arlene Shields  Date:2022  : 1970  [x]  Patient  Verified  Payor: David Arrington / Plan: [place] [employer] / Product Type: HMO /    In time:1254  Out time:132  Total Treatment Time (min): 38  Visit #: 4 of 4    Treatment Area: Left knee pain [M25.562]    SUBJECTIVE  Pain Level (0-10 scale): 4  Any medication changes, allergies to medications, adverse drug reactions, diagnosis change, or new procedure performed?: [x] No    [] Yes (see summary sheet for update)  Subjective functional status/changes:   [] No changes reported  I don't know what I did, but it is really hurting me today. It's been hurting me all week. It's making me limp. I don't know what I did to make it so inflamed.      OBJECTIVE    Modality rationale: decrease edema, decrease inflammation and decrease pain to improve the patients ability to tolerate ADLs   Min Type Additional Details   15 [x] Estim:  [x]Unatt       [x]IFC  []Premod                        []Other:  [x]w/ice   []w/heat  Position:supine with wedge  Location:left knee    [] Estim: []Att    []TENS instruct  []NMES                    []Other:  []w/US   []w/ice   []w/heat  Position:  Location:    []  Traction: [] Cervical       []Lumbar                       [] Prone          []Supine                       []Intermittent   []Continuous Lbs:  [] before manual  [] after manual    []  Ultrasound: []Continuous   [] Pulsed                           []1MHz   []3MHz W/cm2:  Location:    []  Iontophoresis with dexamethasone         Location: [] Take home patch   [] In clinic    []  Ice     []  heat  []  Ice massage  []  Laser   []  Anodyne Position:  Location:    []  Laser with stim  []  Other:  Position:  Location:    []  Vasopneumatic Device Pressure:       [] lo [] med [] hi   Temperature: [] lo [] med [] hi   [] Skin assessment post-treatment:  []intact []redness- no adverse reaction    []redness - adverse reaction:         13 min Manual Therapy:  Grade 1-2 left patellar mobs/ STM left VL   The manual therapy interventions were performed at a separate and distinct time from the therapeutic activities interventions. Rationale: decrease pain and increase ROM to ambulate with minimal pain  10 min Self Care/Home Management: RICE; exercises to avoid in gym   Rationale: increase ROM and increase strength  to improve the patients ability to perform ADLs    With   [] TE   [] TA   [] neuro   [] other: Patient Education: [x] Review HEP    [] Progressed/Changed HEP based on:   [] positioning   [] body mechanics   [] transfers   [] heat/ice application    [] other:      Other Objective/Functional Measures:      Pain Level (0-10 scale) post treatment: 0    ASSESSMENT/Changes in Function: Significant tenderness medial left patella and left plica. Unable to tolerate anything more than grade 1-2 patellar mobs. Ambulated into clinic with an antalgic gait. Patient has f/u with MD the first week in February. Will be put on hold until f/u appointment with MD.    Patient will continue to benefit from skilled PT services to modify and progress therapeutic interventions, address functional mobility deficits, address strength deficits, analyze and address soft tissue restrictions and analyze and cue movement patterns to attain remaining goals. []  See Plan of Care  []  See progress note/recertification  []  See Discharge Summary         Progress towards goals / Updated goals:  Short Term Goals: To be accomplished in 1 weeks:  1. I and compliant with HEP for self management of symptoms.   IE: issued HEP  Current: goal met   [address] be accomplished in 4 weeks:  1. Improve FOTO to 72 to indicate improved function with daily activities.   IE:67  2. Increase left glute strength to 4+5/5 to increase tolerance for prolonged walking. IE: 3+/5  Current: 4/5 1/11/22  3.  Report >=50% improvement to enable patient to resume normal workouts at gym.   IE:0%  Current: 20%       PLAN  []  Upgrade activities as tolerated     [x]  Continue plan of care  []  Update interventions per flow sheet       []  Discharge due to:_  []  Other:_      Hermila Neff MPT, CMTPT 1/18/2022  1:22 PM    Future Appointments   Date Time Provider Jus Garcia   1/25/2022 12:45 PM Jimmie Koehler PT [facility]

## 2022-01-25 ENCOUNTER — APPOINTMENT (OUTPATIENT)
Dept: PHYSICAL THERAPY | Age: 52
End: 2022-01-25
Payer: COMMERCIAL

## 2022-02-14 NOTE — PROGRESS NOTES
[facility]  [address] [facility] [address]  [place], [address]  [phone] fax    Physical Therapy Discharge Summary  Patient name: Maximo Brady Start of Care: 21   Referral source: Garret Beverly, * : 1970   Medical/Treatment Diagnosis: Left knee pain [M25.562]  Payor: Seven Hugo / Plan: [address] [place] [address] [employer] [place] HMO / Product Type: HMO /  Onset Date:3-4 months ago     Prior Hospitalization: see medical history Provider#: [id number]   Medications: Verified on Patient Summary List     Comorbidities: none reported   Prior Level of Function: Sedentary work for the [employer]; works out consistently   Visits from [place] of Care: 4    Missed Visits: 0  Reporting Period : 21 to 22      Summary of Care:  Short Term Goals: To be accomplished in 1 weeks:  1. I and compliant with HEP for self management of symptoms.   IE: issued HEP  Current: goal met   [address] [facility] be accomplished in 4 weeks:  1. Improve FOTO to 72 to indicate improved function with daily activities.   IE:67  Current: unable to reassess   2. Increase left glute strength to 4+5/5 to increase tolerance for prolonged walking. IE: 3+/5  Current: 4/5   3. Report >=50% improvement to enable patient to resume normal workouts at gym.   IE:0%  Current: 20%  Patient has not returned to PT since 22; unable to reassess goals; unplanned D/C.     ASSESSMENT/RECOMMENDATIONS:  [x]Discontinue therapy: []Patient has reached or is progressing toward set goals      [x]Patient is non-compliant or has abdicated      []Due to lack of appreciable progress towards set Ul. Jocelin Webb, PT 2022 9:40 AM

## 2022-03-18 PROBLEM — M47.816 FACET ARTHROPATHY, LUMBAR: Status: ACTIVE | Noted: 2017-01-25

## 2022-03-19 PROBLEM — E66.01 SEVERE OBESITY (HCC): Status: ACTIVE | Noted: 2020-10-28

## 2022-05-10 ENCOUNTER — OFFICE VISIT (OUTPATIENT)
Dept: ORTHOPEDIC SURGERY | Age: 52
End: 2022-05-10
Payer: COMMERCIAL

## 2022-05-10 VITALS — BODY MASS INDEX: 38.32 KG/M2 | HEIGHT: 65 IN | WEIGHT: 230 LBS | TEMPERATURE: 98.7 F

## 2022-05-10 DIAGNOSIS — M75.101 TEAR OF RIGHT ROTATOR CUFF, UNSPECIFIED TEAR EXTENT, UNSPECIFIED WHETHER TRAUMATIC: Primary | ICD-10-CM

## 2022-05-10 PROCEDURE — 99213 OFFICE O/P EST LOW 20 MIN: CPT | Performed by: ORTHOPAEDIC SURGERY

## 2022-05-10 NOTE — PROGRESS NOTES
Saira VEGA Dorian  1970   Chief Complaint   Patient presents with    Shoulder Pain     right        HISTORY OF PRESENT ILLNESS  Saira Main is a 54 y.o. female who presents today for evaluation of right shoulder pain. She rates her pain 1/10 today. Pt was last seen in 2020 and received a cortisone injection for subacromial bursitis. She has pain with reaching overhead or forward. Pt states the pain is limiting and interferes with ADLs. Has tried taking Aleve. Patient denies any fever, chills, chest pain, shortness of breath or calf pain. The remainder of the review of systems is negative. There are no new illness or injuries to report since last seen in the office. There are no changes to medications, allergies, family or social history. REVIEW OF SYSTEM   Patient denies: Weight loss, Fever/Chills, HA, Visual changes, Fatigue, Chest pain, SOB, Abdominal pain, N/V/D/C, Blood in stool or urine, Edema. Pertinent positive as above in HPI. All others were negative    PHYSICAL EXAM:   Visit Vitals  Temp 98.7 °F (37.1 °C)   Ht 5' 5\" (1.651 m)   Wt 230 lb (104.3 kg)   BMI 38.27 kg/m²     The patient is a well-developed, well-nourished female   in no acute distress. The patient is alert and oriented times three. The patient is alert and oriented times three. Mood and affect are normal.  LYMPHATIC: lymph nodes are not enlarged and are within normal limits  SKIN: normal in color and non tender to palpation. There are no bruises or abrasions noted. NEUROLOGICAL: Motor sensory exam is within normal limits. Reflexes are equal bilaterally.  There is normal sensation to pinprick and light touch  MUSCULOSKELETAL:  Examination Right shoulder   Skin Intact   AC joint tenderness -   Biceps tenderness -   Forward flexion/Elevation    Active abduction    Glenohumeral abduction 90   External rotation ROM 45   Internal rotation ROM 30   Apprehension -   Isabellas Relocation -   Jerk -   Load and Carla -   Brie Simzac -   Speeds -   Impingement sign +   Supraspinatus/Empty Can -, 5/5   External Rotation Strength -, 5/5   Lift Off/Belly Press -, 5/5   Neurovascular Intact       PROCEDURE: none    IMAGING: XR of right shoulder obtained in the office dated 10/28/2020 was reviewed and read by Dr. Bessie Holstein: No acute abnormalities         IMPRESSION:      ICD-10-CM ICD-9-CM    1. Tear of right rotator cuff, unspecified tear extent, unspecified whether traumatic  M75.101 840.4         PLAN:  1. Pt presents today with right shoulder pain due to a possible RCT. We will order a MRI to assess the RC. Offered a cortisone injection today. Risk factors include: dm, BMI>35  2. No ultrasound exam indicated today  3. No cortisone injection indicated today  4. No Physical/Occupational Therapy indicated today  5. Yes diagnostic test indicated today: R SHOULDER MRI   6. No durable medical equipment indicated today  7. No referral indicated today   8. No medications indicated today:   9. No Narcotic indicated today      RTC following MRI       Scribed by Yue Golden [facility]) as dictated by Cecile Perry MD    I, Dr. Cecile Perry, confirm that all documentation is accurate.     Cecile Perry M.D.   [facility]

## 2022-05-25 ENCOUNTER — HOSPITAL ENCOUNTER (OUTPATIENT)
Age: 52
Discharge: HOME | End: 2022-05-25
Attending: ORTHOPAEDIC SURGERY
Payer: COMMERCIAL

## 2022-05-25 DIAGNOSIS — M75.101 TEAR OF RIGHT ROTATOR CUFF, UNSPECIFIED TEAR EXTENT, UNSPECIFIED WHETHER TRAUMATIC: ICD-10-CM

## 2022-05-25 PROCEDURE — 73221 MRI JOINT UPR EXTREM W/O DYE: CPT

## 2022-06-02 ENCOUNTER — OFFICE VISIT (OUTPATIENT)
Dept: ORTHOPEDIC SURGERY | Age: 52
End: 2022-06-02
Payer: COMMERCIAL

## 2022-06-02 VITALS — HEIGHT: 65 IN | BODY MASS INDEX: 37.09 KG/M2 | TEMPERATURE: 98.2 F | WEIGHT: 222.6 LBS

## 2022-06-02 DIAGNOSIS — M67.921 BICEPS TENDINOPATHY, RIGHT: Primary | ICD-10-CM

## 2022-06-02 PROCEDURE — 99214 OFFICE O/P EST MOD 30 MIN: CPT | Performed by: ORTHOPAEDIC SURGERY

## 2022-06-02 PROCEDURE — 20611 DRAIN/INJ JOINT/BURSA W/US: CPT | Performed by: ORTHOPAEDIC SURGERY

## 2022-06-02 RX ORDER — TRIAMCINOLONE ACETONIDE 40 MG/ML
40 INJECTION, SUSPENSION INTRA-ARTICULAR; INTRAMUSCULAR ONCE
Status: COMPLETED | OUTPATIENT
Start: 2022-06-02 | End: 2022-06-02

## 2022-06-02 RX ADMIN — TRIAMCINOLONE ACETONIDE 40 MG: 40 INJECTION, SUSPENSION INTRA-ARTICULAR; INTRAMUSCULAR at 08:55

## 2022-06-02 NOTE — PROGRESS NOTES
Saira Alarcon  1970   Chief Complaint   Patient presents with    Results     mri rt shoulder        HISTORY OF PRESENT ILLNESS  Dm Moore is a 54 y.o. female who presents today for reevaluation and MRI review of right shoulder pain. She rates her pain 0/10 today. Pt was last seen in 2020 and received a cortisone injection for subacromial bursitis. She has pain with reaching overhead or forward. Pt states the pain is limiting and interferes with ADLs. Has tried taking Aleeve. Patient denies any fever, chills, chest pain, shortness of breath or calf pain. The remainder of the review of systems is negative. There are no new illness or injuries to report since last seen in the office. There are no changes to medications, allergies, family or social history. REVIEW OF SYSTEM   Patient denies: Weight loss, Fever/Chills, HA, Visual changes, Fatigue, Chest pain, SOB, Abdominal pain, N/V/D/C, Blood in stool or urine, Edema. Pertinent positive as above in HPI. All others were negative    PHYSICAL EXAM:   Visit Vitals  Temp 98.2 °F (36.8 °C) (Temporal)   Ht 5' 5\" (1.651 m)   Wt 222 lb 9.6 oz (101 kg)   BMI 37.04 kg/m²     The patient is a well-developed, well-nourished female   in no acute distress. The patient is alert and oriented times three. The patient is alert and oriented times three. Mood and affect are normal.  LYMPHATIC: lymph nodes are not enlarged and are within normal limits  SKIN: normal in color and non tender to palpation. There are no bruises or abrasions noted. NEUROLOGICAL: Motor sensory exam is within normal limits. Reflexes are equal bilaterally.  There is normal sensation to pinprick and light touch  MUSCULOSKELETAL:  Examination Right shoulder   Skin Intact   AC joint tenderness -   Biceps tenderness ++   Forward flexion/Elevation    Active abduction    Glenohumeral abduction 90   External rotation ROM 45   Internal rotation ROM 30   Apprehension - Isabellas Relocation -   Jerk -   Load and Shift -   Obriens -   Speeds -   Impingement sign +   Supraspinatus/Empty Can -, 5/5   External Rotation Strength -, 5/5   Lift Off/Belly Press -, 5/5   Neurovascular Intact       PROCEDURE:   Right bicep tendon Injection with Ultrasound Guidance    Indication:Right bicep tendon pain/swelling    After sterile prep, 1 cc of Xylocaine and 1 cc of Kenalog were injected into the right bicep tendon. Intra-tendon Ultrasound images captured using [address] Ultrasound machine using a frequency of 10 MHz with a linear transducer and scanned into patient's chart. [facility]  OFFICE PROCEDURE PROGRESS NOTE        Chart reviewed for the following:  Bj Potts M.D, have reviewed the History, Physical and updated the Allergic reactions for [address] performed immediately prior to start of procedure:  Bj Potts M.D, have performed the following reviews on [address] [place] [address] prior to the start of the procedure:            * Patient was identified by name and date of birth   * Agreement on procedure being performed was verified  * Risks and Benefits explained to the patient  * Procedure site verified and marked as necessary  * Patient was positioned for comfort  * Needle placement confirmed by ultrasound  * Consent was signed and verified     Time: 8:13 AM     Date of procedure: 6/2/2022    Procedure performed by:  Jesu Garcia M.D    Provider assisted by: (see medication administration)    How tolerated by patient: tolerated the procedure well with no complications    Comments: none      IMAGING: MRI of the right shoulder dated 5/25/2022 read and reviewed by Dr. Emily Love:   IMPRESSION  1. Mild rotator cuff tendinosis with bursal and undersurface supraspinatus  tendon fraying but no evidence for discrete tear.   2. Mild acromioclavicular osteoarthrosis with low-grade inflammation. Mild  lateral anterior tilting of the acromion. Mild subacromial subdeltoid bursitis. 3. Mild intra-articular biceps tendinosis. Fluid in the extra-articular biceps  tendon sheath, likely tenosynovitis. 4. Questionable anterior inferior labral tear, suboptimally evaluated labrum and  cartilage without intra-articular fluid distention. XR of right shoulder obtained in the office dated 10/28/2020 was reviewed and read by Dr. Rebbeca Nageotte: No acute abnormalities         IMPRESSION:      ICD-10-CM ICD-9-CM    1. Biceps tendinopathy, right  M67.921 727.9         PLAN:  1. Pt presents today with right shoulder pain due to MRI documented bicep tendinopathy and I am hopeful a R bicep cortisone injection will provide relief. Return in 3 weeks if pain persists. Risk factors include: dm, BMI>35  2. No ultrasound exam indicated today  3. Yes cortisone injection indicated today R BICEP US   4. No Physical/Occupational Therapy indicated today  5. No diagnostic test indicated today   6. No durable medical equipment indicated today  7. No referral indicated today   8. No medications indicated today:   9. No Narcotic indicated today      RTC 3 weeks if pain continues       Scribed by Lana Fletcher [facility]) as dictated by Cathleen Acosta MD    I, Dr. Cathleen Acosta, confirm that all documentation is accurate.     Cathleen Acosta M.D.   [facility]

## 2023-02-02 ENCOUNTER — TRANSCRIBE ORDER (OUTPATIENT)
Dept: SCHEDULING | Age: 53
End: 2023-02-02

## 2023-02-02 DIAGNOSIS — Z12.39 BREAST SCREENING: Primary | ICD-10-CM

## 2023-02-05 DIAGNOSIS — Z12.39 BREAST SCREENING: Primary | ICD-10-CM

## 2023-10-09 ENCOUNTER — OFFICE VISIT (OUTPATIENT)
Age: 53
End: 2023-10-09

## 2023-10-09 VITALS — BODY MASS INDEX: 37.15 KG/M2 | WEIGHT: 223 LBS | RESPIRATION RATE: 18 BRPM | HEIGHT: 65 IN

## 2023-10-09 DIAGNOSIS — M79.672 BILATERAL FOOT PAIN: ICD-10-CM

## 2023-10-09 DIAGNOSIS — M79.671 BILATERAL FOOT PAIN: ICD-10-CM

## 2023-10-09 DIAGNOSIS — M72.2 PLANTAR FASCIA SYNDROME: Primary | ICD-10-CM

## 2023-10-09 NOTE — PROGRESS NOTES
phonetic based errors in grammar and contents. Even though attempts were made to correct all the mistakes, some may have been missed, and remained in the body of the document. If questions arise, please contact our department. An electronic signature was used to authenticate this note. Sonya Escobedo may have a reminder for a \"due or due soon\" health maintenance. I have asked that she contact her primary care provider for follow-up on this health maintenance. Documentation by ricardo Brewster, as dictated by Coleman Ricketts. MD Brandy on 10/9/2023.

## 2023-10-09 NOTE — PATIENT INSTRUCTIONS
Please consider purchasing Spenco Total Max Arch Support. This can be bought on [employer] or on [employer] They range ~$40-$50 per pair.

## 2024-06-24 NOTE — PROGRESS NOTES
normal  Heel walk: normal  Sensation: normal    Comments:  Positive figure four test of R        MOTOR:      Elbow Flex  Elbow Ext Arm Abd Wrist Ext Wrist Flex Hand Intrin   Right 5/5 5/5 5/5 5/5 5/5 5/5   Left 5/5 5/5 5/5 5/5 5/5 5/5             Hip flex  Knee Ext EHL Ankle DF Ankle PF      Right 5/5 5/5 5/5 5/5 5/5    Left 5/5 5/5 5/5 5/5 5/5      Ambulation without assistive device.    ASSESSMENT  Nikki Shaffer is a 53 y.o. female who c/o low back pain radiating down the right posterior thigh to the top of the right foot. Pt presents with positive SLR of R. Her symptoms may derive from right L5/S1 radiculopathy and myofascial pain. There is radiologic evidence of moderate to severe canal stenosis at L4-5, contact with right nerve root at L5-S1, and narrowing of right lateral recess at L5-S1.      PLAN  Topamax 3x25mg QHS ramp  Lumbar MRI- for surgical and injection evaluation; worsening symptoms compared to last year; positive SLR of R  Discussed lifestyle changes regarding exercise, diet, sleep, and healthy social relationships.  Pt provided with physician-guided HEP.  We discussed and provided Jason Jackson's and Faye's exercises for pt to add to HEP.  Recommended creams with menthol for topical treatment.  Recommended aqua therapy.    Follow-up and Dispositions    Return in about 5 weeks (around 7/31/2024) for MRI follow up.       Nikki was seen today for lower back pain.    Diagnoses and all orders for this visit:    Lumbar pain    Myofascial pain    Lumbar radiculopathy    Spinal stenosis of lumbar region with neurogenic claudication    Lumbar facet arthropathy      CHIEF COMPLAINT  Nikki Shaffer is seen today in consultation at the request of Audrey Schwartz APRN - NP  for complaints of low back pain radiating down the right leg to her foot.     PAST MEDICAL HISTORY   Past Medical History:   Diagnosis Date    Diabetes (HCC)     Polyarthralgia 9/15    + [place], trial plaquenil Dr. Jolley

## 2024-06-26 ENCOUNTER — OFFICE VISIT (OUTPATIENT)
Age: 54
End: 2024-06-26
Payer: COMMERCIAL

## 2024-06-26 VITALS
BODY MASS INDEX: 36.65 KG/M2 | DIASTOLIC BLOOD PRESSURE: 77 MMHG | HEIGHT: 65 IN | WEIGHT: 220 LBS | SYSTOLIC BLOOD PRESSURE: 132 MMHG | OXYGEN SATURATION: 98 % | HEART RATE: 83 BPM

## 2024-06-26 DIAGNOSIS — M79.18 MYOFASCIAL PAIN: ICD-10-CM

## 2024-06-26 DIAGNOSIS — M47.816 LUMBAR FACET ARTHROPATHY: ICD-10-CM

## 2024-06-26 DIAGNOSIS — M48.062 SPINAL STENOSIS OF LUMBAR REGION WITH NEUROGENIC CLAUDICATION: ICD-10-CM

## 2024-06-26 DIAGNOSIS — M54.16 LUMBAR RADICULOPATHY: ICD-10-CM

## 2024-06-26 DIAGNOSIS — M54.50 LUMBAR PAIN: Primary | ICD-10-CM

## 2024-06-26 PROCEDURE — 99204 OFFICE O/P NEW MOD 45 MIN: CPT | Performed by: PHYSICAL MEDICINE & REHABILITATION

## 2024-06-26 RX ORDER — TOPIRAMATE 25 MG/1
75 TABLET ORAL NIGHTLY
Qty: 90 TABLET | Refills: 2 | Status: SHIPPED | OUTPATIENT
Start: 2024-06-26 | End: 2024-09-24

## 2024-06-26 ASSESSMENT — ENCOUNTER SYMPTOMS
TROUBLE SWALLOWING: 0
SHORTNESS OF BREATH: 0
BACK PAIN: 1
VOMITING: 0
WHEEZING: 0
NAUSEA: 0

## 2024-06-26 NOTE — PATIENT INSTRUCTIONS
Elvia exercises link:  [email or web address]    Example of one of the Elvia exercises:    Jason Alejandro's Big three exercises link:  [email or web address]

## 2024-07-11 ENCOUNTER — HOSPITAL ENCOUNTER (OUTPATIENT)
Facility: HOSPITAL | Age: 54
Discharge: HOME | End: 2024-07-11
Attending: PHYSICAL MEDICINE & REHABILITATION
Payer: COMMERCIAL

## 2024-07-11 DIAGNOSIS — M54.16 LUMBAR RADICULOPATHY: ICD-10-CM

## 2024-07-11 DIAGNOSIS — M48.062 SPINAL STENOSIS OF LUMBAR REGION WITH NEUROGENIC CLAUDICATION: ICD-10-CM

## 2024-07-11 DIAGNOSIS — M54.50 LUMBAR PAIN: ICD-10-CM

## 2024-07-11 DIAGNOSIS — M47.816 LUMBAR FACET ARTHROPATHY: ICD-10-CM

## 2024-07-11 DIAGNOSIS — M79.18 MYOFASCIAL PAIN: ICD-10-CM

## 2024-07-11 PROCEDURE — 72148 MRI LUMBAR SPINE W/O DYE: CPT

## 2024-07-30 NOTE — PROGRESS NOTES
Severe facet arthropathy. Bilateral facet joint effusions. Increased signal around facet joints, right greater than left. Moderate central canal stenosis. Moderate to severe foraminal stenosis, right greater than left.     -L4-5: Diffuse disc bulge. Severe right and moderate left facet arthropathy. Right facet joint effusion. Mild/moderate foraminal stenosis. Moderate to severe central canal stenosis.     -L5-S1: Diffuse disc bulge with right paracentral annular tear, contact with crusting right nerve root. Moderate right and mild left facet arthropathy. Moderate narrowing of central canal. Mild foraminal stenosis. Narrowing of right lateral recess.      Impression     1. Facet arthropathy with evidence of synovitis at L4-L5, right greater than left.  2. Significant central canal stenosis at L4-L5.    25 minutes of face-to-face contact were spent with the patient during today's visit extensively discussing symptoms and treatment plan.  All questions were answered. More than half of this visit today was spent on counseling.      By signing my name below, I, ANDRES Martinez, attest that this documentation has been prepared under the direction and in the presence of Nando Mcclain MD  Electronically signed: ANDRES Martinez. 8/6/24 10:27 AM EDT    I, Nando Mcclain MD, personally performed the services described in this documentation. I have authorized the scribe to complete the medical record entries input within this chart. I have reviewed the chart and agree that the record reflects my personal performance and is accurate and complete. [Electronically Signed: Nando Mcclain MD. 8/6/2024 3:49 PM ]

## 2024-08-06 ENCOUNTER — OFFICE VISIT (OUTPATIENT)
Age: 54
End: 2024-08-06
Payer: COMMERCIAL

## 2024-08-06 VITALS — RESPIRATION RATE: 16 BRPM | HEIGHT: 65 IN | WEIGHT: 218 LBS | BODY MASS INDEX: 36.32 KG/M2

## 2024-08-06 DIAGNOSIS — M54.50 LUMBAR PAIN: ICD-10-CM

## 2024-08-06 DIAGNOSIS — M48.062 SPINAL STENOSIS OF LUMBAR REGION WITH NEUROGENIC CLAUDICATION: ICD-10-CM

## 2024-08-06 DIAGNOSIS — M54.16 LUMBAR RADICULOPATHY: Primary | ICD-10-CM

## 2024-08-06 DIAGNOSIS — M79.18 MYOFASCIAL PAIN: ICD-10-CM

## 2024-08-06 DIAGNOSIS — M47.816 LUMBAR FACET ARTHROPATHY: ICD-10-CM

## 2024-08-06 PROCEDURE — 99214 OFFICE O/P EST MOD 30 MIN: CPT | Performed by: PHYSICAL MEDICINE & REHABILITATION

## 2024-08-06 RX ORDER — TOPIRAMATE 100 MG/1
100 TABLET, FILM COATED ORAL NIGHTLY
Qty: 90 TABLET | Refills: 1 | Status: SHIPPED | OUTPATIENT
Start: 2024-08-06 | End: 2025-02-02

## 2024-08-06 ASSESSMENT — ENCOUNTER SYMPTOMS
SHORTNESS OF BREATH: 0
WHEEZING: 0
NAUSEA: 0
BACK PAIN: 1
TROUBLE SWALLOWING: 0
VOMITING: 0

## 2024-08-22 ENCOUNTER — APPOINTMENT (OUTPATIENT)
Facility: HOSPITAL | Age: 54
End: 2024-08-22
Payer: COMMERCIAL

## 2024-08-28 ENCOUNTER — HOSPITAL ENCOUNTER (OUTPATIENT)
Facility: HOSPITAL | Age: 54
Setting detail: RECURRING SERIES
Discharge: HOME | End: 2024-08-31
Payer: COMMERCIAL

## 2024-08-28 PROCEDURE — 97161 PT EVAL LOW COMPLEX 20 MIN: CPT

## 2024-08-28 NOTE — PROGRESS NOTES
PT DAILY TREATMENT NOTE/LUMBAR EVAL       Patient Name: Nikki Shaffer    Date: 2024    : 1970  Insurance: Payor: BRIE / Plan: BRIE POS / Product Type: *No Product type* /      Patient  verified yes     Visit #   Current / Total 1 10   Time   In / Out 11:10 11:50   Pain   In / Out 0 0   Subjective Functional Status/Changes: See POC   Changes to:  Meds, Allergies, Med Hx, Sx Hx?  If yes, update Summary List yes, see POC     Treatment Area: Other low back pain [M54.59]  Left hip pain [M25.552]  Right hip pain [M25.551]    SUBJECTIVE    CC: low back pain, right LE pain, B hip pain  History/Mechanism of Injury: leg pain going on for at least a year, lower back pain going on for longer than that  Current Symptoms/Complaints:   Low back pain- more stiffness with prolonged sitting, first thing in morning  Right leg pain- increased pain from right buttock to right lateral knee today, usually extends to right LE, tingling pain, worse at random  B hip pain- achiness all the itme  Pain-  Current: 0/10     Worst: 8/10   Best: 0/10  Aggravated By: prolonged sitting, prolonged standing  Alleviated By: Cindyve  Previous Treatment/Compliance: scheduled for injections in the coming weeks  PMHx/Surgical Hx: pre-diabetic, HLD  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 (see flow sheet as applicable)     Details if applicable:     21 [id number] Therapeutic Activity

## 2024-08-28 NOTE — PROGRESS NOTES
[facility]  [address] [address] Phone: [phone] Fax   Plan of Care / Statement of Necessity for Physical Therapy Services     Patient Name: Nikki Shaffer : 1970   Medical   Diagnosis: Other low back pain [M54.59]  Left hip pain [M25.552]  Right hip pain [M25.551] Treatment Diagnosis: M25.551  RIGHT HIP PAIN and M25.552  LEFT HIP PAIN  and M54.59  OTHER LOWER BACK PAIN     Onset Date: 24 (referral) Payor Payor: BRIE / Plan: SENTARA POS / Product Type: *No Product type* /    Referral Source: Nando Mcclain MD Start of Care (SOC): 2024   Prior Hospitalization: See medical history Provider #: [id number]   Prior Level of Function: Functionally independent   Comorbidities: pre-diabetic, HLD   Social determinants of health: Physical activity     Assessment / key information:    Pt is a pleasant 54 y.o. female who presents with c/o bilateral hip pain and low back pain associated with right LE pain. The patient reports general achiness and stiffness impacting her lower back and both hips for several years now; she notes more recent tingling right posterolateral thigh/calf pain that is exacerbated with prolonged standing and extending her right knee in seated position. Signs/symptoms at eval consistent with mechanical back/hip pain with associated radicular pain into right LE; this right LE pain responds positively to flexion biased interventions at initial evaluation.  Functional deficits include: impaired hip strength, impaired trunk AROM, back/LE pain limiting function.  Rehab potential is good due to desire to attain PLOF. Pt would benefit from skilled PT to address above deficits to improve Pt's function and ability to return to PLOF with decreased pain and improved functional mobility.      Evaluation Complexity:  History:  MEDIUM  Complexity : 1-2 comorbidities / personal factors will impact the outcome/ POC

## 2024-09-03 ENCOUNTER — HOSPITAL ENCOUNTER (OUTPATIENT)
Facility: HOSPITAL | Age: 54
Discharge: HOME | End: 2024-09-06
Payer: COMMERCIAL

## 2024-09-03 VITALS
HEART RATE: 63 BPM | DIASTOLIC BLOOD PRESSURE: 90 MMHG | RESPIRATION RATE: 16 BRPM | SYSTOLIC BLOOD PRESSURE: 146 MMHG | TEMPERATURE: 98 F | OXYGEN SATURATION: 99 %

## 2024-09-03 LAB
GLUCOSE BLD STRIP.AUTO-MCNC: 92 MG/DL (ref 70–110)
HCG UR QL: NEGATIVE

## 2024-09-03 PROCEDURE — 64484 NJX AA&/STRD TFRM EPI L/S EA: CPT | Performed by: PHYSICAL MEDICINE & REHABILITATION

## 2024-09-03 PROCEDURE — 82962 GLUCOSE BLOOD TEST: CPT

## 2024-09-03 PROCEDURE — 64484 NJX AA&/STRD TFRM EPI L/S EA: CPT

## 2024-09-03 PROCEDURE — 81025 URINE PREGNANCY TEST: CPT

## 2024-09-03 PROCEDURE — 6370000000 HC RX 637 (ALT 250 FOR IP): Performed by: PHYSICAL MEDICINE & REHABILITATION

## 2024-09-03 PROCEDURE — 2500000003 HC RX 250 WO HCPCS: Performed by: PHYSICAL MEDICINE & REHABILITATION

## 2024-09-03 PROCEDURE — 6360000002 HC RX W HCPCS: Performed by: PHYSICAL MEDICINE & REHABILITATION

## 2024-09-03 PROCEDURE — 64483 NJX AA&/STRD TFRM EPI L/S 1: CPT | Performed by: PHYSICAL MEDICINE & REHABILITATION

## 2024-09-03 PROCEDURE — 64483 NJX AA&/STRD TFRM EPI L/S 1: CPT

## 2024-09-03 PROCEDURE — 6360000004 HC RX CONTRAST MEDICATION: Performed by: PHYSICAL MEDICINE & REHABILITATION

## 2024-09-03 RX ORDER — DIAZEPAM 5 MG
2.5 TABLET ORAL ONCE
Status: COMPLETED | OUTPATIENT
Start: 2024-09-03 | End: 2024-09-03

## 2024-09-03 RX ORDER — DIAZEPAM 5 MG
5 TABLET ORAL ONCE
Status: COMPLETED | OUTPATIENT
Start: 2024-09-03 | End: 2024-09-03

## 2024-09-03 RX ORDER — LIDOCAINE HYDROCHLORIDE 10 MG/ML
30 INJECTION, SOLUTION EPIDURAL; INFILTRATION; INTRACAUDAL; PERINEURAL ONCE
Status: COMPLETED | OUTPATIENT
Start: 2024-09-03 | End: 2024-09-03

## 2024-09-03 RX ORDER — IOPAMIDOL 408 MG/ML
4 INJECTION, SOLUTION INTRATHECAL
Status: COMPLETED | OUTPATIENT
Start: 2024-09-03 | End: 2024-09-03

## 2024-09-03 RX ORDER — DIAZEPAM 5 MG
10 TABLET ORAL ONCE
Status: COMPLETED | OUTPATIENT
Start: 2024-09-03 | End: 2024-09-03

## 2024-09-03 RX ORDER — DEXAMETHASONE SODIUM PHOSPHATE 10 MG/ML
10 INJECTION, SOLUTION INTRAMUSCULAR; INTRAVENOUS ONCE
Status: COMPLETED | OUTPATIENT
Start: 2024-09-03 | End: 2024-09-03

## 2024-09-03 RX ADMIN — DEXAMETHASONE SODIUM PHOSPHATE 10 MG: 10 INJECTION, SOLUTION INTRAMUSCULAR; INTRAVENOUS at 09:01

## 2024-09-03 RX ADMIN — LIDOCAINE HYDROCHLORIDE 15 ML: 10 INJECTION, SOLUTION EPIDURAL; INFILTRATION; INTRACAUDAL; PERINEURAL at 08:55

## 2024-09-03 RX ADMIN — IOPAMIDOL 2 ML: 408 INJECTION, SOLUTION INTRATHECAL at 09:01

## 2024-09-03 RX ADMIN — DIAZEPAM 5 MG: 5 TABLET ORAL at 08:27

## 2024-09-03 ASSESSMENT — PAIN - FUNCTIONAL ASSESSMENT: PAIN_FUNCTIONAL_ASSESSMENT: 0-10

## 2024-09-03 ASSESSMENT — PAIN DESCRIPTION - DESCRIPTORS: DESCRIPTORS: TINGLING;OTHER (COMMENT)

## 2024-09-03 ASSESSMENT — PAIN SCALES - GENERAL: PAINLEVEL_OUTOF10: 1

## 2024-09-03 NOTE — INTERVAL H&P NOTE
Update History & Physical    The patient's History and Physical of August 6, 2024 was reviewed. There was no change. The surgical site was confirmed by the patient and me.     Plan: The risks, benefits, expected outcome, and alternative to the recommended procedure have been discussed with the patient. Patient understands and wants to proceed with the procedure.     Electronically signed by GONZALO CHANDLER MD on 9/3/2024 at 8:47 AM

## 2024-09-03 NOTE — PROCEDURES
PROCEDURE NOTE  Date: 9/3/2024   Name: Nikki Shaffer  YOB: 1970    Procedures        SELECTIVE NERVE ROOT BLOCK PROCEDURE NOTE      Patient Name: Nikki Shaffer  Date of Procedure: September 3, 2024  Preoperative Diagnosis:  Lumbar Radicular Pain  Post Operative Diagnosis:  Lumbar Radicular Pain  Location:  [place], [place]    Procedure :    right L5 Selective Nerve Root Block  right S1 Selective Nerve Root Block    Consent:  Informed consent was obtained prior to the procedure.  The patient was given the opportunity to ask questions regarding the procedure and its associated risks.  In addition to the potential risks associated with the procedure itself, the patient was informed both verbally and in writing of the potential side effects of the use of glucocorticoid.  The patient appeared to comprehend the informed consent and desired to have the procedure performed.        Procedure:  The patient was placed in the prone position on the fluoroscopy table and the back was prepped and draped in the usual sterile manner.  The exact spinal level was  identified using fluoroscopy, and Lidocaine 1 % was injected locally, a 22 gauge spinal needle was passed to the transverse process.  The depth was noted and the needle redirected to pass inferior and approximately one cm anterior to the transverse process.    Yes  about 1 cc of Isovue M-200 was used to verify positioning in the epidural and paravertebral space and outlined the course of the spinal nerve into the epidural space.  The same procedure was repeated at each spinal level indicated above. No vascular uptake was identified.    A total of 10 mg of preservative free Dexamethasone and 1 cc of Lidocaine/site was slowly injected.       The patient tolerated the procedure well.  The injection area was cleaned and bandaids applied.  Not excessive bleeding was noted.   Patient dressed and discharged to home with

## 2024-09-25 NOTE — PROGRESS NOTES
previously presented with positive SLR of R, resolved after injection. There is radiologic evidence of moderate to severe canal stenosis at L4-5, contact with right nerve root at L5-S1, and compression of right lateral recess at L5-S1.     No results found for: \"CREATININE\"  PLAN  Referral to Dr. Rm licea of left foot pain  Continue Topamax 100mg QHS. No refills required at this time.   Discussed importance of maintaining consistent physical activity and mobility for muscle strengthening and conditioning.  Discussed repeat right L5, S1 SNRBs including risks and benefits. Pt denied at this time. Pt may call to schedule or if after 30 days, may be scheduled for virtual visit.  We discussed strategies and recommendations for postural restoration and maintenance.  Educated pt on biomechanical role of core musculature and recommended core strengthening.    Follow-up and Dispositions    Return in about 3 months (around 1/1/2025).       Nikki was seen today for lower back pain.    Diagnoses and all orders for this visit:    Lumbar radiculopathy    Spinal stenosis of lumbar region with neurogenic claudication    Lumbar facet arthropathy    Chronic primary musculoskeletal pain    Lumbar pain    Left foot pain  -     Ambulatory referral to Orthopedic Surgery         PAST MEDICAL HISTORY   Past Medical History:   Diagnosis Date    Diabetes (HCC)     Polyarthralgia 9/15    + [place]LL[place], trial plaquenil Dr. Jolley    Rheumatoid arthritis (HCC)        Past Surgical History:   Procedure Laterality Date    GYN      ectopic pregnancy    TUBAL LIGATION         MEDICATIONS      Current Outpatient Medications   Medication Sig Dispense Refill    atorvastatin (LIPITOR) 10 MG tablet TK 1 T PO D      celecoxib (CELEBREX) 100 MG capsule ceived the following from [facility]: Outside name: celecoxib (CELEBREX) 100 mg capsule      hydroCHLOROthiazide (HYDRODIURIL) 25 MG tablet TAKE 1 T PO D      topiramate (TOPAMAX) 100 MG

## 2024-10-01 ENCOUNTER — OFFICE VISIT (OUTPATIENT)
Age: 54
End: 2024-10-01
Payer: COMMERCIAL

## 2024-10-01 VITALS
OXYGEN SATURATION: 99 % | TEMPERATURE: 97.1 F | HEART RATE: 69 BPM | BODY MASS INDEX: 36.46 KG/M2 | RESPIRATION RATE: 16 BRPM | HEIGHT: 65 IN | WEIGHT: 218.8 LBS

## 2024-10-01 DIAGNOSIS — G89.29 CHRONIC PRIMARY MUSCULOSKELETAL PAIN: ICD-10-CM

## 2024-10-01 DIAGNOSIS — M47.816 LUMBAR FACET ARTHROPATHY: ICD-10-CM

## 2024-10-01 DIAGNOSIS — M48.062 SPINAL STENOSIS OF LUMBAR REGION WITH NEUROGENIC CLAUDICATION: ICD-10-CM

## 2024-10-01 DIAGNOSIS — M54.16 LUMBAR RADICULOPATHY: Primary | ICD-10-CM

## 2024-10-01 DIAGNOSIS — M79.672 LEFT FOOT PAIN: ICD-10-CM

## 2024-10-01 DIAGNOSIS — M79.18 CHRONIC PRIMARY MUSCULOSKELETAL PAIN: ICD-10-CM

## 2024-10-01 DIAGNOSIS — M54.50 LUMBAR PAIN: ICD-10-CM

## 2024-10-01 PROCEDURE — 99213 OFFICE O/P EST LOW 20 MIN: CPT | Performed by: PHYSICAL MEDICINE & REHABILITATION

## 2024-10-01 ASSESSMENT — ENCOUNTER SYMPTOMS
TROUBLE SWALLOWING: 0
WHEEZING: 0
NAUSEA: 0
SHORTNESS OF BREATH: 0
BACK PAIN: 1
VOMITING: 0

## 2024-11-03 SDOH — HEALTH STABILITY: PHYSICAL HEALTH: ON AVERAGE, HOW MANY MINUTES DO YOU ENGAGE IN EXERCISE AT THIS LEVEL?: 40 MIN

## 2024-11-03 SDOH — HEALTH STABILITY: PHYSICAL HEALTH: ON AVERAGE, HOW MANY DAYS PER WEEK DO YOU ENGAGE IN MODERATE TO STRENUOUS EXERCISE (LIKE A BRISK WALK)?: 1 DAY

## 2024-11-05 ENCOUNTER — OFFICE VISIT (OUTPATIENT)
Age: 54
End: 2024-11-05

## 2024-11-05 VITALS — HEIGHT: 65 IN | BODY MASS INDEX: 35.82 KG/M2 | WEIGHT: 215 LBS

## 2024-11-05 DIAGNOSIS — M76.62 ACHILLES TENDINITIS OF LEFT LOWER EXTREMITY: Primary | ICD-10-CM

## 2024-11-05 DIAGNOSIS — M79.672 LEFT FOOT PAIN: ICD-10-CM

## 2024-11-05 NOTE — PROGRESS NOTES
No abnormal calcific densities to soft tissues. No osteolytic or osteoblastic lesions noted, no projection x-ray images. Mineralization suggests no osteopenia. Degenerative changes/Joint Condition: No Significant OA is not noted. Calcified vessels are not present.     I have personally reviewed the results of the above study and the interpretation of this study is my professional opinion          CHART REVIEW     Nikki Shaffer has been experiencing pain and discomfort confirmed as outlined in the pain assessment outlined below.     was reviewed by Josh Nava MD on 11/5/2024.     PAST MEDICAL HISTORY:   Past Medical History:   Diagnosis Date    Diabetes (HCC)     Polyarthralgia 9/15    + [place], trial plaquenil Dr. Jolley    Rheumatoid arthritis (HCC)      PAST SURGICAL HISTORY:   Past Surgical History:   Procedure Laterality Date    GYN      ectopic pregnancy    TUBAL LIGATION       ALLERGIES:   Allergies   Allergen Reactions    Diclofenac Hives    Gabapentin Hives     Even low dose of 100 mg. Causes side effects    Pregabalin Other (See Comments)     Weight gain      FAMILY HISTORY:   Family History   Problem Relation Age of Onset    Heart Disease Father     HIV/AIDS Mother      SOCIAL HISTORY:   Social History     Socioeconomic History    Marital status:      Spouse name: None    Number of children: None    Years of education: None    Highest education level: None   Tobacco Use    Smoking status: Former     Types: Cigarettes    Smokeless tobacco: Never   Substance and Sexual Activity    Alcohol use: Yes     Comment: About 3 times a month    Drug use: No    Sexual activity: Not Currently     Partners: Male     Social Determinants of Health     Physical Activity: Insufficiently Active (11/3/2024)    Exercise Vital Sign     Days of Exercise per Week: 1 day     Minutes of Exercise per Session: 40 min       CURRENT MEDICATIONS:  A list of medications prior to the time of admission include:    Current

## 2024-12-20 NOTE — PROGRESS NOTES
annular tear, contact with crusting right nerve root. Moderate right and mild left facet arthropathy. Moderate narrowing of central canal. Mild foraminal stenosis. Narrowing of right lateral recess.      Impression     1. Facet arthropathy with evidence of synovitis at L4-L5, right greater than left.  2. Significant central canal stenosis at L4-L5.    21 minutes of face-to-face contact were spent with the patient during today's visit extensively discussing symptoms and treatment plan.  All questions were answered. More than half of this visit today was spent on counseling.      By signing my name below, Ozzie NEFF SCRIBE, attest that this documentation has been prepared under the direction and in the presence of Nando Mcclain MD  Electronically signed: ANDRES Martinez. 1/7/25 11:26 AM EST     Nando NEFF MD, personally performed the services described in this documentation. I have authorized the scribe to complete the medical record entries input within this chart. I have reviewed the chart and agree that the record reflects my personal performance and is accurate and complete. [Electronically Signed: Nando Mcclain MD. 1/7/2025 5:09 PM]

## 2025-01-07 ENCOUNTER — OFFICE VISIT (OUTPATIENT)
Age: 55
End: 2025-01-07
Payer: COMMERCIAL

## 2025-01-07 VITALS — WEIGHT: 220 LBS | RESPIRATION RATE: 16 BRPM | BODY MASS INDEX: 36.65 KG/M2 | HEIGHT: 65 IN

## 2025-01-07 DIAGNOSIS — M47.816 LUMBAR FACET ARTHROPATHY: Primary | ICD-10-CM

## 2025-01-07 DIAGNOSIS — M48.062 SPINAL STENOSIS OF LUMBAR REGION WITH NEUROGENIC CLAUDICATION: ICD-10-CM

## 2025-01-07 DIAGNOSIS — M54.16 LUMBAR RADICULOPATHY: ICD-10-CM

## 2025-01-07 DIAGNOSIS — M25.551 PAIN OF RIGHT HIP: ICD-10-CM

## 2025-01-07 DIAGNOSIS — G89.29 CHRONIC PRIMARY MUSCULOSKELETAL PAIN: ICD-10-CM

## 2025-01-07 DIAGNOSIS — M79.18 CHRONIC PRIMARY MUSCULOSKELETAL PAIN: ICD-10-CM

## 2025-01-07 DIAGNOSIS — M54.50 LUMBAR PAIN: ICD-10-CM

## 2025-01-07 PROCEDURE — 99214 OFFICE O/P EST MOD 30 MIN: CPT | Performed by: PHYSICAL MEDICINE & REHABILITATION

## 2025-01-07 RX ORDER — CELECOXIB 200 MG/1
200 CAPSULE ORAL 2 TIMES DAILY
Qty: 60 CAPSULE | Refills: 5 | Status: SHIPPED | OUTPATIENT
Start: 2025-01-07 | End: 2025-07-06

## 2025-01-07 RX ORDER — TOPIRAMATE 100 MG/1
100 TABLET, FILM COATED ORAL NIGHTLY
Qty: 90 TABLET | Refills: 1 | Status: SHIPPED | OUTPATIENT
Start: 2025-01-07 | End: 2025-07-06

## 2025-01-07 ASSESSMENT — ENCOUNTER SYMPTOMS
TROUBLE SWALLOWING: 0
WHEEZING: 0
SHORTNESS OF BREATH: 0
BACK PAIN: 1
VOMITING: 0
NAUSEA: 0

## 2025-01-07 NOTE — PATIENT INSTRUCTIONS
Jason Alejandro's Big three exercises link:  [email or web address]     Elvia exercises link:  [email or web address]    Example of one of the Elvia exercises:

## 2025-01-09 ENCOUNTER — OFFICE VISIT (OUTPATIENT)
Age: 55
End: 2025-01-09
Payer: COMMERCIAL

## 2025-01-09 VITALS — WEIGHT: 219 LBS | HEIGHT: 65 IN | BODY MASS INDEX: 36.49 KG/M2

## 2025-01-09 DIAGNOSIS — M70.61 TROCHANTERIC BURSITIS OF RIGHT HIP: Primary | ICD-10-CM

## 2025-01-09 PROCEDURE — 99213 OFFICE O/P EST LOW 20 MIN: CPT | Performed by: ORTHOPAEDIC SURGERY

## 2025-01-09 PROCEDURE — 73502 X-RAY EXAM HIP UNI 2-3 VIEWS: CPT | Performed by: ORTHOPAEDIC SURGERY

## 2025-01-09 SDOH — HEALTH STABILITY: PHYSICAL HEALTH: ON AVERAGE, HOW MANY DAYS PER WEEK DO YOU ENGAGE IN MODERATE TO STRENUOUS EXERCISE (LIKE A BRISK WALK)?: 0 DAYS

## 2025-01-09 SDOH — HEALTH STABILITY: PHYSICAL HEALTH: ON AVERAGE, HOW MANY MINUTES DO YOU ENGAGE IN EXERCISE AT THIS LEVEL?: 0 MIN

## 2025-01-09 NOTE — PROGRESS NOTES
Patient: Nikki Shaffer                MRN: [id number]       SSN: [id number]  YOB: 1970        AGE: 54 y.o.        SEX: female  BMI: Body mass index is 36.44 kg/m².    PCP: Mihai Yañez MD  01/09/25    Chief Complaint: Hip Pain (Right hip)      1. Trochanteric bursitis of right hip  -     AMB POC X-RAY RADEX HIP UNI WITH PELVIS 2-3 VIEWS        HPI:  Nikki Shaffer is a 54 y.o. female with chief complaint of   Chief Complaint   Patient presents with    Hip Pain     Right hip     New patient referred by Dr. Mcclain for right hip pain.  She reports that she has bilateral hip pain but the right side tends to hurt worse.  It is mostly on the lateral side of the hip and is very sore when she lays on that side.  She puts a pillow between her legs to help with that.  She got a massage yesterday (as of 1/9/2025) and notes today that she is having minimal pain.    Known risk factors for perioperative complications: Obesity BMI < 40   BMI Readings from Last 1 Encounters:   01/09/25 36.44 kg/m²        IMAGING:  Imaging read by myself and interpreted as follows:    January 9, 2025:  Three-view x-ray of the right hip including AP pelvis and AP and crosstable lateral of the right hip demonstrates bilateral hip mild joint space narrowing.  There does appear to be a cyst in the acetabular dome on the right side.  There is also a small calcification in the area of the superior lateral labrum.  The joint space narrowing is symmetric to the contralateral side.  There are mild arthritic changes in the partially visualized lower lumbar spine.      PHYSICAL EXAMINATION:  Ht 1.651 m (5' 5\")   Wt 99.3 kg (219 lb)   BMI 36.44 kg/m²   Body mass index is 36.44 kg/m².  Wt Readings from Last 3 Encounters:   01/09/25 99.3 kg (219 lb)   01/07/25 99.8 kg (220 lb)   11/05/24 97.5 kg (215 lb)     No results found for: \"LABA1C\" No results found for: \"[id number]\"  No results found for: \"LABPROT\",

## 2025-05-13 ENCOUNTER — TRANSCRIBE ORDERS (OUTPATIENT)
Facility: HOSPITAL | Age: 55
End: 2025-05-13

## 2025-05-13 DIAGNOSIS — Z12.31 ENCOUNTER FOR SCREENING MAMMOGRAM FOR MALIGNANT NEOPLASM OF BREAST: Primary | ICD-10-CM

## 2025-05-28 ENCOUNTER — OFFICE VISIT (OUTPATIENT)
Age: 55
End: 2025-05-28
Payer: COMMERCIAL

## 2025-05-28 VITALS
DIASTOLIC BLOOD PRESSURE: 80 MMHG | BODY MASS INDEX: 36.62 KG/M2 | TEMPERATURE: 98.1 F | HEIGHT: 65 IN | WEIGHT: 219.8 LBS | HEART RATE: 68 BPM | OXYGEN SATURATION: 97 % | SYSTOLIC BLOOD PRESSURE: 126 MMHG

## 2025-05-28 DIAGNOSIS — G89.29 CHRONIC PRIMARY MUSCULOSKELETAL PAIN: ICD-10-CM

## 2025-05-28 DIAGNOSIS — M79.18 CHRONIC PRIMARY MUSCULOSKELETAL PAIN: ICD-10-CM

## 2025-05-28 DIAGNOSIS — M48.062 SPINAL STENOSIS OF LUMBAR REGION WITH NEUROGENIC CLAUDICATION: ICD-10-CM

## 2025-05-28 DIAGNOSIS — M54.16 LUMBAR RADICULOPATHY: ICD-10-CM

## 2025-05-28 DIAGNOSIS — M47.816 LUMBAR FACET ARTHROPATHY: Primary | ICD-10-CM

## 2025-05-28 DIAGNOSIS — M54.50 LUMBAR PAIN: ICD-10-CM

## 2025-05-28 PROCEDURE — 99213 OFFICE O/P EST LOW 20 MIN: CPT | Performed by: PHYSICAL MEDICINE & REHABILITATION

## 2025-05-28 RX ORDER — CELECOXIB 100 MG/1
100 CAPSULE ORAL 2 TIMES DAILY
Qty: 60 CAPSULE | Refills: 5 | Status: SHIPPED | OUTPATIENT
Start: 2025-05-28 | End: 2025-11-24

## 2025-05-28 ASSESSMENT — ENCOUNTER SYMPTOMS
WHEEZING: 0
NAUSEA: 0
SHORTNESS OF BREATH: 0
BACK PAIN: 1
VOMITING: 0
TROUBLE SWALLOWING: 0

## 2025-05-28 NOTE — PROGRESS NOTES
Nikki Shaffer presents today for   Chief Complaint   Patient presents with    Back Pain     Back pain the same as last visit       Is someone accompanying this pt? no    Is the patient using any DME equipment during OV? no      Coordination of Care:  1. Have you been to the ER, urgent care clinic since your last visit? no  Hospitalized since your last visit? no    2. Have you seen or consulted any other health care providers outside of the [facility] since your last visit? no Include any pap smears or colon screening. no

## 2025-05-28 NOTE — PROGRESS NOTES
[facility]  [facility], [address]  [place] [address]  Phone: [phone]  Fax: [phone]      Nikki Shaffer  : 1970  PCP: Mihai Yañez MD  2025    PROGRESS NOTE    HISTORY OF PRESENT ILLNESS    24  C/o low back pain radiating down the right posterior thigh to the top of her foot.   Pt previously tried Gabapentin and Lyrica but stopped due to somnolence.   Pt previously seen by Dr. Mustafa for back problems but discontinued care as problem had resolved.   Pt previously attended PT with mild benefit.   Patient maintains HEP.   Pt previously had piriformis injection and epidural injections with Dr. Anderson at [facility].   Pt noted previous right hip pain in groin area. Pt notes limited standing/walking tolerance. Pt notes occasional relief when sitting. Pt notes her pain is more severe during the day and less at night.   PLAN: Topamax 3x25mg QHS ramp; Lumbar MRI     24  Pt continues with lumbar pain radiating down the leg to the top of the foot (R>L)   Pt takes Topamax 3x25mg QHS with benefit to pain and weight loss.   Lumbar MRI images dated 7/15/24 were reviewed. Per report, Interval progression of disproportionate mild to moderate degenerative disc and facet joint disease L3-L5. Moderate central canal with lateral recess stenosis L4-5. Mild canal stenosis L3-4 and L5-S1. L5-S1 right subarticular disc protrusion with osteophyte complex resulting in compression/entrapment of the of the right traversing S1 nerve root. Also degenerative left lateral recess stenosis with less pronounced compression on the left traversing S1 nerve root. Multilevel mild foraminal stenosis.  PLAN: Increase Topamax to 100mg QHS; Right L5, S1 SNRB; Referral to PT- w/ [facility] therapy     10/1/24  Pt underwent Right L5, S1 SNRBs (9/3/24, Dr. Staley) with significant benefit.   Pt attended PT eval 24.   Pt notes her occasional twinges of pain. Pt

## 2025-06-05 ENCOUNTER — HOSPITAL ENCOUNTER (OUTPATIENT)
Facility: HOSPITAL | Age: 55
Setting detail: RECURRING SERIES
Discharge: HOME | End: 2025-06-08
Attending: PHYSICAL MEDICINE & REHABILITATION
Payer: COMMERCIAL

## 2025-06-05 PROCEDURE — 97161 PT EVAL LOW COMPLEX 20 MIN: CPT

## 2025-06-05 NOTE — THERAPY EVALUATION
[facility]  [address], [address], [place] [address] Ph:[phone] Fx: [phone]  Plan of Care / Statement of Necessity for Physical Therapy Services     Patient Name: Nikki Shaffer : 1970   Medical   Diagnosis:  Spinal stenosis of lumbar region with neurogenic claudication  Lumbar pain Treatment Diagnosis: M25.551  RIGHT HIP PAIN  and M54.59  OTHER LOWER BACK PAIN   Onset Date: 2-3 years     Referral Source: Nando Mcclain MD Start of Care (SOC): 2025   Prior Hospitalization: See medical history Provider #: [id number]   Prior Level of Function: Some limited mobility secondary to back and hip pain.    Comorbidities: Other: pre diabetic        Assessment / key information:  Nikki Shaffer is a 54 y.o. female who presents to skilled PT for the treatment diagnosis of LBP and hip pain. The pain has been going on for so long. Gradual onset with no DANIELLE. Thinks it is related to the job due to sitting for long periods of time. The lower back is always a problems. The pain is generally staying about the same. Notes stiffness with initiation upon standing in the hips and lower back. If moving she feels better, but prolonged standing and walking causes some increased pain. Also notes of some pain in the R leg that radiates to the back of the knee with tingling. Pain is not constant. Reports less pain being in a higher chair. Some days she can walk about 1 hour and has a lot of pain in the lower back. If sitting down too long, can have the R leg pain. Also feels the R leg sxs at night. As per the R hip pain, hurts laying on the R side. Sometimes the worst in the morning but this can very day to day,   Has hx of PT for her lower back prior about 1-2 years ago. Most recent in injection was a year ago and that helped a lot.   Wants to go back to the gym and workout. Last time was about 6 months ago.    MRI Lumbar spine without contrast per

## 2025-06-05 NOTE — PROGRESS NOTES
PHYSICAL / OCCUPATIONAL THERAPY - DAILY TREATMENT NOTE (updated )  For Eval visit    Patient Name: Nikki Shaffer    Date: 2025    : 1970  Insurance: Payor: BRIE / Plan: BRIE POS / Product Type: *No Product type* /      Patient  verified yes     Visit #   Current / Total 1 16   Time   In / Out 8:22 9:00   Pain   In / Out 1/10 1/10   Subjective Functional Status/Changes: See POC     TREATMENT AREA =  Spinal stenosis of lumbar region with neurogenic claudication  Lumbar pain  Other low back pain  Pain in right hip  Pain in right leg    OBJECTIVE           33 min   Eval - untimed                      Therapeutic Procedures:  Tx Min Billable or 1:1 Min (if diff from Tx Min) Procedure, Rationale, Specifics   5  27727 Self Care/Home Management (timed):  improve patient knowledge and understanding of home injury/symptom/pain management, positioning, posture/ergonomics, home safety, activity modification, transfer techniques, and joint protection strategies  to improve patient's ability to progress to PLOF and address remaining functional goals.  (see flow sheet as applicable)     Details if applicable:      Reviewed & discussed:   [x]Diagnosis, prognosis, therapy progression   [x]HEP review  []Post op protocol  []Current PT POC and goals (personal/functional goals)   []PT attendance (no show/cancellation policy)  []Modalities (ice vs heat) and/or self STM techniques             Details if applicable:      []   assessing strength/ROM  []   assessing activity performance (ex: sit to stand, stair negotiation)  []   assessing balance/control (ex. Blanton, DGI, SLS)          Details if applicable:      []   assessing strength/ROM  []   assessing activity performance (ex: sit to stand, stair negotiation)  []   assessing balance/control (ex. Blanton, DGI, SLS)          Details if applicable:      []   assessing strength/ROM  []   assessing activity performance (ex: sit to stand, stair negotiation)  []

## 2025-06-19 NOTE — PROGRESS NOTES
PHYSICAL / OCCUPATIONAL THERAPY - DAILY TREATMENT NOTE (updated )    Patient Name: Nikki Shaffer    Date: 2025    : 1970  Insurance: Payor: BRIE / Plan: BRIE POS / Product Type: *No Product type* /      Patient  verified Yes     Visit #   Current / Total 2 16   Time   In / Out 7:48 8:28   Pain   In / Out 0/10 0/10   Subjective Functional Status/Changes: Woke up with some pain this morning with the pain going down the leg. It does not bother her when sleeping. This morning and a couple days ago is when it started bothering her. Once she gets up, it bothers her.      TREATMENT AREA =  Spinal stenosis of lumbar region with neurogenic claudication  Lumbar pain  Other low back pain  Pain in right hip  Pain in right leg    OBJECTIVE         Therapeutic Procedures:  Tx Min Billable or 1:1 Min (if diff from Tx Min) Procedure, Rationale, Specifics   15  01807 Therapeutic Exercise (timed):  increase ROM, strength, coordination, balance, and proprioception to improve patient's ability to progress to PLOF and address remaining functional goals. (see flow sheet as applicable)     Details if applicable:      []   assessing strength/ROM  []   assessing activity performance (ex: sit to stand, stair negotiation)  []   assessing balance/control (ex. Blanton, DGI, SLS)   10  31359 Neuromuscular Re-Education (timed):  improve balance, coordination, kinesthetic sense, posture, core stability and proprioception to improve patient's ability to develop conscious control of individual muscles and awareness of position of extremities in order to progress to PLOF and address remaining functional goals. (see flow sheet as applicable)     Details if applicable:      []   assessing strength/ROM  []   assessing activity performance (ex: sit to stand, stair negotiation)  []   assessing balance/control (ex. Blanton, DGI, SLS)          Details if applicable:      []   assessing strength/ROM  []   assessing activity

## 2025-06-20 ENCOUNTER — HOSPITAL ENCOUNTER (OUTPATIENT)
Facility: HOSPITAL | Age: 55
Setting detail: RECURRING SERIES
Discharge: HOME | End: 2025-06-23
Attending: PHYSICAL MEDICINE & REHABILITATION
Payer: COMMERCIAL

## 2025-06-20 PROCEDURE — 97140 MANUAL THERAPY 1/> REGIONS: CPT

## 2025-06-20 PROCEDURE — 97110 THERAPEUTIC EXERCISES: CPT

## 2025-06-20 PROCEDURE — 97112 NEUROMUSCULAR REEDUCATION: CPT

## 2025-06-23 ENCOUNTER — HOSPITAL ENCOUNTER (OUTPATIENT)
Facility: HOSPITAL | Age: 55
Setting detail: RECURRING SERIES
Discharge: HOME | End: 2025-06-26
Attending: PHYSICAL MEDICINE & REHABILITATION
Payer: COMMERCIAL

## 2025-06-23 PROCEDURE — 97140 MANUAL THERAPY 1/> REGIONS: CPT

## 2025-06-23 PROCEDURE — 97110 THERAPEUTIC EXERCISES: CPT

## 2025-06-23 PROCEDURE — 97112 NEUROMUSCULAR REEDUCATION: CPT

## 2025-06-23 NOTE — PROGRESS NOTES
PHYSICAL / OCCUPATIONAL THERAPY - DAILY TREATMENT NOTE (updated )    Patient Name: Nikki Shaffer    Date: 2025    : 1970  Insurance: Payor: BRIE / Plan: BRIE POS / Product Type: *No Product type* /      Patient  verified Yes     Visit #   Current / Total 3 16   Time   In / Out 9:47 10:20   Pain   In / Out 0/10 0/10   Subjective Functional Status/Changes: Did a little gardening this morning. The back is not too bad with the gardening.   [place] she [place] to [place] and the back did okay. The back was tight but it did not really bother her. Bother her leg some.   The back is tight somewhat today and also has some tingling in the R leg today.      TREATMENT AREA =  Spinal stenosis of lumbar region with neurogenic claudication  Lumbar pain  Other low back pain  Pain in right hip  Pain in right leg    OBJECTIVE         Therapeutic Procedures:  Tx Min Billable or 1:1 Min (if diff from Tx Min) Procedure, Rationale, Specifics   13  55167 Therapeutic Exercise (timed):  increase ROM, strength, coordination, balance, and proprioception to improve patient's ability to progress to PLOF and address remaining functional goals. (see flow sheet as applicable)     Details if applicable:      []   assessing strength/ROM  []   assessing activity performance (ex: sit to stand, stair negotiation)  []   assessing balance/control (ex. Blanton, DGI, SLS)   10  47963 Neuromuscular Re-Education (timed):  improve balance, coordination, kinesthetic sense, posture, core stability and proprioception to improve patient's ability to develop conscious control of individual muscles and awareness of position of extremities in order to progress to PLOF and address remaining functional goals. (see flow sheet as applicable)     Details if applicable:      []   assessing strength/ROM  []   assessing activity performance (ex: sit to stand, stair negotiation)  []   assessing balance/control (ex. Blanton, DGI, SLS)          Details if

## 2025-06-25 ENCOUNTER — TELEPHONE (OUTPATIENT)
Facility: HOSPITAL | Age: 55
End: 2025-06-25

## 2025-06-30 ENCOUNTER — HOSPITAL ENCOUNTER (OUTPATIENT)
Facility: HOSPITAL | Age: 55
Setting detail: RECURRING SERIES
Discharge: HOME | End: 2025-07-03
Attending: PHYSICAL MEDICINE & REHABILITATION
Payer: COMMERCIAL

## 2025-06-30 PROCEDURE — 97535 SELF CARE MNGMENT TRAINING: CPT

## 2025-06-30 PROCEDURE — 97110 THERAPEUTIC EXERCISES: CPT

## 2025-06-30 PROCEDURE — 97112 NEUROMUSCULAR REEDUCATION: CPT

## 2025-06-30 NOTE — PROGRESS NOTES
PHYSICAL / OCCUPATIONAL THERAPY - DAILY TREATMENT NOTE (updated )    Patient Name: Nikki Shaffer    Date: 2025    : 1970  Insurance: Payor: BRIE / Plan: BRIE POS / Product Type: *No Product type* /      Patient  verified Yes     Visit #   Current / Total 4 16   Time   In / Out 1105 1143   Pain   In / Out 0/10 0/10   Subjective Functional Status/Changes: Pt reports lots of pain and stiffness this morning.  Notes no pain at the moment.  Says that she has a gym membership and will start this week.     TREATMENT AREA =  Spinal stenosis of lumbar region with neurogenic claudication  Lumbar pain  Other low back pain  Pain in right hip  Pain in right leg    OBJECTIVE         Therapeutic Procedures:  Tx Min Billable or 1:1 Min (if diff from Tx Min) Procedure, Rationale, Specifics   20  66593 Therapeutic Exercise (timed):  increase ROM, strength, coordination, balance, and proprioception to improve patient's ability to progress to PLOF and address remaining functional goals. (see flow sheet as applicable)     Details if applicable:      []   assessing strength/ROM  []   assessing activity performance (ex: sit to stand, stair negotiation)  []   assessing balance/control (ex. Blanton, DGI, SLS)   8  06648 Neuromuscular Re-Education (timed):  improve balance, coordination, kinesthetic sense, posture, core stability and proprioception to improve patient's ability to develop conscious control of individual muscles and awareness of position of extremities in order to progress to PLOF and address remaining functional goals. (see flow sheet as applicable)     Details if applicable:      []   assessing strength/ROM  []   assessing activity performance (ex: sit to stand, stair negotiation)  []   assessing balance/control (ex. Blanton, DGI, SLS)          Details if applicable:      []   assessing strength/ROM  []   assessing activity performance (ex: sit to stand, stair negotiation)  []   assessing

## 2025-07-01 ENCOUNTER — TELEPHONE (OUTPATIENT)
Facility: HOSPITAL | Age: 55
End: 2025-07-01

## 2025-07-01 NOTE — TELEPHONE ENCOUNTER
I called  because she missed her appointment today. She did not answer, so I left her a message reminding her of her next appointment and our attendance policy.

## 2025-07-08 ENCOUNTER — HOSPITAL ENCOUNTER (OUTPATIENT)
Facility: HOSPITAL | Age: 55
Setting detail: RECURRING SERIES
Discharge: HOME | End: 2025-07-11
Attending: PHYSICAL MEDICINE & REHABILITATION
Payer: COMMERCIAL

## 2025-07-08 PROCEDURE — 97110 THERAPEUTIC EXERCISES: CPT

## 2025-07-08 PROCEDURE — 97535 SELF CARE MNGMENT TRAINING: CPT

## 2025-07-08 NOTE — THERAPY RECERTIFICATION
buttock to back of knee    ROM/Strength         AROM                            PROM                            Strength (1-5)  Hip Left Right Left Right Left Right   Flexion WNL NT NT NT 5/5 4+/5    Extension 10 8 NT NT 4+/5 4+/5   Abduction - - - - NT NT   Adduction - - - - - -   ER 23 30 64 50 5/5 4+/5   IR 35 25 30 30  5/5 4+/5         Short Term Goals: To be accomplished in 4 weeks  1) Pt will be IND with HEP to facilitate self care management.   EVAL: Provided HEP  Current Status: - Pt is performing 2x daily  Goal Met?  - MET     2) Pt will improve lumbar AROM to B rot and B SB to 100% without pain >/= 3/10 in order to improve ability to perform ADLs with improved ROM and less restriction.    EVAL: Thoracolumbar AROM (with 100% being full ROM)   Flexion: 100% tight tension R posterior hip, and incr tension in R posterior LE   Extension: 50%   L SB: 90%   R SB: 100%  L Rot: 90%   R Rot: 100% p! R hip area  Current Status: - Flexion: 100% no tension in R hip  Extension: 50%, tingling from R buttock to back of knee return to standing  L SB: 90% , tight on the right  R SB: 100%  L Rot: 90%   R Rot: 90%, tingling from R buttock to back of knee  Goal Met?  - Progressing     3) Pt will improve worst pain levels from initial evaluation level of 5/10 to 3/10 to show improved QOL and improved overall perception of pain-free/more pain-free function with ADLs.   EVAL: worst pain 5/10  Current Status: - 4/10  Goal Met?  - progressing     Long Term Goals: To be accomplished in 8 weeks  1) Pt will improve PETER scores to </= 35% in order to show detectable change in overall function.    EVAL: 48%  Current Status: - 22%  Goal Met?  - MET     2) Pt will be able to perform walking >/ 1 hours with </= 3/10 pain signifying improvement in overall functional capacity and activity tolerance.    EVAL: limited to 1 hour with high pain   Current Status: - Pt has not attempted since starting PT  Goal Met?  - Ongoing     3) Pt will improve

## 2025-07-08 NOTE — PROGRESS NOTES
weekly - 10 reps - 2-3\" hold  - Seated Flexion Stretch  - 1-2 x daily - 7 x weekly - 2-3 sets - 20-30\" hold  - Supine Posterior Pelvic Tilt  - 1 x daily - 7 x weekly - 3 sets - 10 reps  - Supine Bridge  - 1 x daily - 4 x weekly - 3 sets - 10 reps  - Sidelying Hip Abduction  - 1 x daily - 4 x weekly - 3 sets - 10 reps  [] Other detail:       Objective Information/Functional Measures/Assessment    Pt arrived 10min late for appointment. See progress note      Patient will continue to benefit from skilled PT / OT services to modify and progress therapeutic interventions, analyze and address functional mobility deficits, analyze and address ROM deficits, analyze and address strength deficits, analyze and address soft tissue restrictions, analyze and cue for proper movement patterns, and analyze and modify for postural abnormalities to address functional deficits and attain remaining goals.    GOALS: Pt with good response to pt education and will likely begin going to the gym - significant to overall goals. - 6/30/25    Progress toward goals / Updated goals:  [x]  See Progress Note/Recertification    Short Term Goals: To be accomplished in 4 weeks  1) Pt will be IND with HEP to facilitate self care management.   EVAL: Provided HEP  Current Status: - been doing HEP, 6/23/25  Goal Met?  - NA     2) Pt will improve lumbar AROM to B rot and B SB to 100% without pain >/= 3/10 in order to improve ability to perform ADLs with improved ROM and less restriction.    EVAL: Thoracolumbar AROM (with 100% being full ROM)   Flexion: 100% tight tension R posterior hip, and incr tension in R posterior LE   Extension: 50%   L SB: 90%   R SB: 100%  L Rot: 90%   R Rot: 100% p! R hip area  Current Status: -   LS AROM   Flexion 100%   Extension 75%   L %   R %   L rot 75%   R rot 75% LBP, 6/20/25  Goal Met?  - NA     3) Pt will improve worst pain levels from initial evaluation level of 5/10 to 3/10 to show improved QOL and improved

## 2025-07-10 ENCOUNTER — HOSPITAL ENCOUNTER (OUTPATIENT)
Facility: HOSPITAL | Age: 55
Setting detail: RECURRING SERIES
Discharge: HOME | End: 2025-07-13
Attending: PHYSICAL MEDICINE & REHABILITATION
Payer: COMMERCIAL

## 2025-07-10 PROCEDURE — 97110 THERAPEUTIC EXERCISES: CPT

## 2025-07-10 PROCEDURE — 97530 THERAPEUTIC ACTIVITIES: CPT

## 2025-07-10 PROCEDURE — 97535 SELF CARE MNGMENT TRAINING: CPT

## 2025-07-10 NOTE — PROGRESS NOTES
PHYSICAL / OCCUPATIONAL THERAPY - DAILY TREATMENT NOTE (updated )    Patient Name: Nikki Shaffer    Date: 7/10/2025    : 1970  Insurance: Payor: BRIE / Plan: BRIE POS / Product Type: *No Product type* /      Patient  verified Yes     Visit #   Current / Total 6 16   Time   In / Out 750    Pain   In / Out 0/10    Subjective Functional Status/Changes: Just stiff when she woke up this morning. No pain reported. Pt is doing her step class every other day and tolerating well.     TREATMENT AREA =  Spinal stenosis of lumbar region with neurogenic claudication  Lumbar pain  Other low back pain  Pain in right hip  Pain in right leg    OBJECTIVE    Pt arrived 10min late for session.       Therapeutic Procedures:  Tx Min Billable or 1:1 Min (if diff from Tx Min) Procedure, Rationale, Specifics   23  [id number] Therapeutic Exercise (timed):  increase ROM, strength, coordination, balance, and proprioception to improve patient's ability to progress to PLOF and address remaining functional goals. (see flow sheet as applicable)     Details if applicable:      [x]   assessing strength/ROM  []   assessing activity performance (ex: sit to stand, stair negotiation)  []   assessing balance/control (ex. Blanton, DGI, SLS)     01810 Neuromuscular Re-Education (timed):  improve balance, coordination, kinesthetic sense, posture, core stability and proprioception to improve patient's ability to develop conscious control of individual muscles and awareness of position of extremities in order to progress to PLOF and address remaining functional goals. (see flow sheet as applicable)     Details if applicable:      []   assessing strength/ROM  []   assessing activity performance (ex: sit to stand, stair negotiation)  []   assessing balance/control (ex. Blanton, DGI, SLS)          Details if applicable:      []   assessing strength/ROM  []   assessing activity performance (ex: sit to stand, stair negotiation)  []   assessing

## 2025-07-10 NOTE — THERAPY DISCHARGE
PT DISCHARGE DAILY NOTE AND SUMMARY     Date:7/10/2025  Patient name: Nikki Shaffer Start of Care: 2025   Referral source: Nando Mcclain MD : 1970   Medical/Treatment Diagnosis: Spinal stenosis of lumbar region with neurogenic claudication  Lumbar pain  Other low back pain  Pain in right hip  Pain in right leg Onset Date:2-3 years ago     Prior Hospitalization: see medical history Provider#: [id number]   Comorbidities: Pre Diabetes  Prior Level of Function:Some limited mobility due to LBP and hip pain  Insurance: Payor: [employer] / Plan: [employer] POS / Product Type: *No Product type* /      Visits from Start of Care: 6 Missed Visits: 2    non-Medicare    Patient  verified yes     Visit #   Current / Total 6 16   Time   In / Out 750 820   Pain   In / Out 010 010   Subjective Functional Status/Changes: Just stiff when she wakes up in the morning.  Pt is doing her step class every other day and tolerating well.States she feels like she can manage with her exercises on her own and is ready to d/c.      TREATMENT AREA =  Spinal stenosis of lumbar region with neurogenic claudication  Lumbar pain  Other low back pain  Pain in right hip  Pain in right leg    OBJECTIVE    Therapeutic Procedures:    Tx Min Billable or 1:1 Min (if diff from Tx Min) Procedure, Rationale, Specifics     04582 Manual Therapy (timed):  decrease pain, increase ROM, increase tissue extensibility, and decrease trigger points to improve patient's ability to progress to PLOF and address remaining functional goals.  The manual therapy interventions were performed at a separate and distinct time from the therapeutic activities interventions . (see flow sheet as applicable)     Details if applicable:    Updated and reviewed HEP  []   assessing strength/ROM  []   assessing activity performance (ex: sit to stand, stair negotiation)  []   assessing balance/control (ex. Blanton, DGI, SLS)     10  77405 Therapeutic Exercise (timed):

## 2025-07-14 ENCOUNTER — HOSPITAL ENCOUNTER (OUTPATIENT)
Facility: HOSPITAL | Age: 55
Setting detail: RECURRING SERIES
End: 2025-07-14
Attending: PHYSICAL MEDICINE & REHABILITATION
Payer: COMMERCIAL

## 2025-07-16 ENCOUNTER — APPOINTMENT (OUTPATIENT)
Facility: HOSPITAL | Age: 55
End: 2025-07-16
Attending: PHYSICAL MEDICINE & REHABILITATION
Payer: COMMERCIAL

## 2025-07-20 DIAGNOSIS — M54.16 LUMBAR RADICULOPATHY: ICD-10-CM

## 2025-07-20 DIAGNOSIS — M48.062 SPINAL STENOSIS OF LUMBAR REGION WITH NEUROGENIC CLAUDICATION: ICD-10-CM

## 2025-07-20 RX ORDER — TOPIRAMATE 100 MG/1
100 TABLET, FILM COATED ORAL NIGHTLY
Qty: 90 TABLET | Refills: 1 | Status: SHIPPED | OUTPATIENT
Start: 2025-07-20

## 2025-07-22 ENCOUNTER — APPOINTMENT (OUTPATIENT)
Facility: HOSPITAL | Age: 55
End: 2025-07-22
Attending: PHYSICAL MEDICINE & REHABILITATION
Payer: COMMERCIAL

## 2025-07-24 ENCOUNTER — APPOINTMENT (OUTPATIENT)
Facility: HOSPITAL | Age: 55
End: 2025-07-24
Attending: PHYSICAL MEDICINE & REHABILITATION
Payer: COMMERCIAL

## 2025-07-29 ENCOUNTER — APPOINTMENT (OUTPATIENT)
Facility: HOSPITAL | Age: 55
End: 2025-07-29
Attending: PHYSICAL MEDICINE & REHABILITATION
Payer: COMMERCIAL

## 2025-07-31 ENCOUNTER — APPOINTMENT (OUTPATIENT)
Facility: HOSPITAL | Age: 55
End: 2025-07-31
Attending: PHYSICAL MEDICINE & REHABILITATION
Payer: COMMERCIAL

## 2025-08-21 ENCOUNTER — SCHEDULED TELEPHONE ENCOUNTER (OUTPATIENT)
Age: 55
End: 2025-08-21
Payer: COMMERCIAL

## 2025-08-21 DIAGNOSIS — M47.816 LUMBAR FACET ARTHROPATHY: ICD-10-CM

## 2025-08-21 DIAGNOSIS — M54.16 LUMBAR RADICULOPATHY: Primary | ICD-10-CM

## 2025-08-21 DIAGNOSIS — G89.29 CHRONIC PRIMARY MUSCULOSKELETAL PAIN: ICD-10-CM

## 2025-08-21 DIAGNOSIS — M79.18 CHRONIC PRIMARY MUSCULOSKELETAL PAIN: ICD-10-CM

## 2025-08-21 DIAGNOSIS — M48.062 SPINAL STENOSIS OF LUMBAR REGION WITH NEUROGENIC CLAUDICATION: ICD-10-CM

## 2025-08-21 DIAGNOSIS — M54.50 LUMBAR PAIN: ICD-10-CM

## 2025-08-21 PROCEDURE — 99214 OFFICE O/P EST MOD 30 MIN: CPT | Performed by: PHYSICAL MEDICINE & REHABILITATION

## 2025-08-21 RX ORDER — PREDNISOLONE ACETATE 10 MG/ML
SUSPENSION/ DROPS OPHTHALMIC
COMMUNITY
Start: 2025-04-29

## 2025-08-21 ASSESSMENT — ENCOUNTER SYMPTOMS
VOMITING: 0
TROUBLE SWALLOWING: 0
NAUSEA: 0
SHORTNESS OF BREATH: 0
WHEEZING: 0
BACK PAIN: 1